# Patient Record
Sex: FEMALE | Race: BLACK OR AFRICAN AMERICAN | NOT HISPANIC OR LATINO | Employment: FULL TIME | URBAN - METROPOLITAN AREA
[De-identification: names, ages, dates, MRNs, and addresses within clinical notes are randomized per-mention and may not be internally consistent; named-entity substitution may affect disease eponyms.]

---

## 2023-09-05 ENCOUNTER — HOSPITAL ENCOUNTER (OUTPATIENT)
Dept: DATA CONVERSION | Facility: HOSPITAL | Age: 38
Discharge: HOME | End: 2023-09-05
Payer: COMMERCIAL

## 2023-09-05 DIAGNOSIS — E55.9 VITAMIN D DEFICIENCY, UNSPECIFIED: ICD-10-CM

## 2023-09-05 DIAGNOSIS — I10 ESSENTIAL (PRIMARY) HYPERTENSION: ICD-10-CM

## 2023-09-05 DIAGNOSIS — R73.03 PREDIABETES: ICD-10-CM

## 2023-09-05 DIAGNOSIS — Z00.00 ENCOUNTER FOR GENERAL ADULT MEDICAL EXAMINATION WITHOUT ABNORMAL FINDINGS: ICD-10-CM

## 2023-09-05 LAB
25(OH)D3 SERPL-MCNC: 17 NG/ML (ref 31–100)
ALBUMIN SERPL-MCNC: 4 GM/DL (ref 3.5–5)
ALBUMIN/GLOB SERPL: 1.4 RATIO (ref 1.5–3)
ALP BLD-CCNC: 64 U/L (ref 35–125)
ALT SERPL-CCNC: 23 U/L (ref 5–40)
ANION GAP SERPL CALCULATED.3IONS-SCNC: 12 MMOL/L (ref 0–19)
APPEARANCE PLAS: CLEAR
AST SERPL-CCNC: 18 U/L (ref 5–40)
BILIRUB SERPL-MCNC: 0.5 MG/DL (ref 0.1–1.2)
BUN SERPL-MCNC: 9 MG/DL (ref 8–25)
BUN/CREAT SERPL: 12.9 RATIO (ref 8–21)
CALCIUM SERPL-MCNC: 9.2 MG/DL (ref 8.5–10.4)
CHLORIDE SERPL-SCNC: 110 MMOL/L (ref 97–107)
CHOLEST SERPL-MCNC: 137 MG/DL (ref 133–200)
CHOLEST/HDLC SERPL: 3 RATIO
CO2 SERPL-SCNC: 22 MMOL/L (ref 24–31)
COLOR SPUN FLD: YELLOW
CREAT SERPL-MCNC: 0.7 MG/DL (ref 0.4–1.6)
DEPRECATED RDW RBC AUTO: 41.2 FL (ref 37–54)
ERYTHROCYTE [DISTWIDTH] IN BLOOD BY AUTOMATED COUNT: 12.1 % (ref 11.7–15)
FASTING STATUS PATIENT QL REPORTED: ABNORMAL
GFR SERPL CREATININE-BSD FRML MDRD: 115 ML/MIN/1.73 M2
GLOBULIN SER-MCNC: 2.8 G/DL (ref 1.9–3.7)
GLUCOSE SERPL-MCNC: 96 MG/DL (ref 65–99)
HBA1C MFR BLD: 5.6 % (ref 4–6)
HCT VFR BLD AUTO: 38.6 % (ref 36–44)
HDLC SERPL-MCNC: 45 MG/DL
HGB BLD-MCNC: 13.1 GM/DL (ref 12–15)
LDLC SERPL CALC-MCNC: 75 MG/DL (ref 65–130)
MCH RBC QN AUTO: 31.3 PG (ref 26–34)
MCHC RBC AUTO-ENTMCNC: 33.9 % (ref 31–37)
MCV RBC AUTO: 92.3 FL (ref 80–100)
NRBC BLD-RTO: 0 /100 WBC
PLATELET # BLD AUTO: 340 K/UL (ref 150–450)
PMV BLD AUTO: 9.2 CU (ref 7–12.6)
POTASSIUM SERPL-SCNC: 3.4 MMOL/L (ref 3.4–5.1)
PROT SERPL-MCNC: 6.8 G/DL (ref 5.9–7.9)
RBC # BLD AUTO: 4.18 M/UL (ref 4–4.9)
SODIUM SERPL-SCNC: 144 MMOL/L (ref 133–145)
TRIGL SERPL-MCNC: 84 MG/DL (ref 40–150)
TSH SERPL DL<=0.05 MIU/L-ACNC: 0.96 MIU/L (ref 0.27–4.2)
WBC # BLD AUTO: 5.7 K/UL (ref 4.5–11)

## 2023-11-28 ENCOUNTER — TELEPHONE (OUTPATIENT)
Dept: PRIMARY CARE | Facility: CLINIC | Age: 38
End: 2023-11-28
Payer: COMMERCIAL

## 2023-11-28 DIAGNOSIS — I10 PRIMARY HYPERTENSION: Primary | ICD-10-CM

## 2023-11-28 RX ORDER — HYDROCHLOROTHIAZIDE 12.5 MG/1
TABLET ORAL
COMMUNITY
End: 2023-11-28 | Stop reason: SDUPTHER

## 2023-11-28 RX ORDER — AMLODIPINE BESYLATE 5 MG/1
TABLET ORAL EVERY 24 HOURS
COMMUNITY
End: 2023-11-28 | Stop reason: SDUPTHER

## 2023-11-28 RX ORDER — AMLODIPINE BESYLATE 5 MG/1
5 TABLET ORAL DAILY
Qty: 90 TABLET | Refills: 1 | Status: SHIPPED | OUTPATIENT
Start: 2023-11-28 | End: 2024-05-26

## 2023-11-28 RX ORDER — HYDROCHLOROTHIAZIDE 12.5 MG/1
12.5 TABLET ORAL DAILY
Qty: 90 TABLET | Refills: 1 | Status: SHIPPED | OUTPATIENT
Start: 2023-11-28 | End: 2024-05-26

## 2023-11-28 NOTE — TELEPHONE ENCOUNTER
Physical  08/31/2023 pt called in for refill on BP meds they have been populated as well as pharmacy. Please advise

## 2023-12-08 ENCOUNTER — OFFICE VISIT (OUTPATIENT)
Dept: OPHTHALMOLOGY | Facility: CLINIC | Age: 38
End: 2023-12-08

## 2023-12-08 DIAGNOSIS — H40.003 GLAUCOMA SUSPECT OF BOTH EYES: ICD-10-CM

## 2023-12-08 DIAGNOSIS — H18.603 KERATOCONUS OF BOTH EYES: Primary | ICD-10-CM

## 2023-12-08 DIAGNOSIS — H52.213 IRREGULAR ASTIGMATISM OF BOTH EYES: ICD-10-CM

## 2023-12-08 DIAGNOSIS — H52.13 MYOPIA, BILATERAL: ICD-10-CM

## 2023-12-08 LAB
AVERAGE RNFL TODAY (OD): 99 MICRONS
AVERAGE RNFL TODAY (OS): NORMAL MICRONS
KMAX (OD): 50.5 DIOPTERS
KMAX (OS): 58.9 DIOPTERS
PACH THINNEST (OD): 494 MICRONS
PACH THINNEST (OS): 425 MICRONS
POSTERIOR FLOAT (OD): 15 MICRONS
POSTERIOR FLOAT (OS): 71 MICRONS
SIMK FLAT (OD): 46.5 DIOPTERS
SIMK FLAT (OS): 48.6 DIOPTERS
SIMK STEEP (OD): 48.7 DIOPTERS
SIMK STEEP (OS): 53.9 DIOPTERS

## 2023-12-08 PROCEDURE — 92014 COMPRE OPH EXAM EST PT 1/>: CPT | Performed by: OPTOMETRIST

## 2023-12-08 PROCEDURE — V2599 CONTACT LENS/ES OTHER TYPE: HCPCS | Performed by: OPTOMETRIST

## 2023-12-08 PROCEDURE — 92025 CPTRIZED CORNEAL TOPOGRAPHY: CPT | Performed by: OPTOMETRIST

## 2023-12-08 PROCEDURE — 92133 CPTRZD OPH DX IMG PST SGM ON: CPT | Performed by: OPTOMETRIST

## 2023-12-08 PROCEDURE — 92015 DETERMINE REFRACTIVE STATE: CPT | Performed by: OPTOMETRIST

## 2023-12-08 PROCEDURE — 92072 FITG C-LENS KERATOCONUS 1ST: CPT | Performed by: OPTOMETRIST

## 2023-12-08 RX ORDER — TRIAMCINOLONE ACETONIDE 5 MG/G
OINTMENT TOPICAL
COMMUNITY
Start: 2023-05-25 | End: 2024-03-20 | Stop reason: WASHOUT

## 2023-12-08 RX ORDER — PRENATAL VIT 91/IRON/FOLIC/DHA 28-975-200
1 COMBINATION PACKAGE (EA) ORAL EVERY 12 HOURS
COMMUNITY
Start: 2023-02-09 | End: 2024-03-20 | Stop reason: ALTCHOICE

## 2023-12-08 ASSESSMENT — REFRACTION_CURRENTRX
OD_DIAMETER: 14.5
OD_ADDL_SPECS: MED SKIRT
OD_SPHERE: PLANO
OS_DIAMETER: 14.5
OS_SPHERE: -3.50
OS_ADDL_SPECS: MED SKIRT

## 2023-12-08 ASSESSMENT — REFRACTION_MANIFEST
OD_CYLINDER: -1.75
OS_AXIS: 120
OD_SPHERE: -1.50
OD_AXIS: 033
OS_AXIS: 120
OS_CYLINDER: -5.25
METHOD_AUTOREFRACTION: 1
OD_SPHERE: -1.00
OD_CYLINDER: -2.25
OS_SPHERE: -2.50
OD_AXIS: 043
OS_SPHERE: -2.25
OS_CYLINDER: -6.50

## 2023-12-08 ASSESSMENT — ENCOUNTER SYMPTOMS
HEMATOLOGIC/LYMPHATIC NEGATIVE: 0
PSYCHIATRIC NEGATIVE: 0
EYES NEGATIVE: 0
NEUROLOGICAL NEGATIVE: 0
RESPIRATORY NEGATIVE: 0
CARDIOVASCULAR NEGATIVE: 0
ALLERGIC/IMMUNOLOGIC NEGATIVE: 0
MUSCULOSKELETAL NEGATIVE: 0
GASTROINTESTINAL NEGATIVE: 0
ENDOCRINE NEGATIVE: 0
CONSTITUTIONAL NEGATIVE: 0

## 2023-12-08 ASSESSMENT — VISUAL ACUITY
OD_SC: 20/50
METHOD: SNELLEN - LINEAR
OS_SC: 20/160
OS_SC+: -1

## 2023-12-08 ASSESSMENT — TONOMETRY
IOP_METHOD: TONOPEN
OS_IOP_MMHG: 11
OD_IOP_MMHG: 15
IOP_METHOD: GOLDMANN APPLANATION
OD_IOP_MMHG: 11
OS_IOP_MMHG: 15

## 2023-12-08 ASSESSMENT — CONF VISUAL FIELD
OD_NORMAL: 1
OS_INFERIOR_NASAL_RESTRICTION: 0
OD_INFERIOR_NASAL_RESTRICTION: 0
OS_INFERIOR_TEMPORAL_RESTRICTION: 0
OD_INFERIOR_TEMPORAL_RESTRICTION: 0
OS_NORMAL: 1
OD_SUPERIOR_NASAL_RESTRICTION: 0
OS_SUPERIOR_TEMPORAL_RESTRICTION: 0
OS_SUPERIOR_NASAL_RESTRICTION: 0
OD_SUPERIOR_TEMPORAL_RESTRICTION: 0

## 2023-12-08 ASSESSMENT — EXTERNAL EXAM - RIGHT EYE: OD_EXAM: NORMAL

## 2023-12-08 ASSESSMENT — CUP TO DISC RATIO
OS_RATIO: 0.6
OD_RATIO: 0.4

## 2023-12-08 ASSESSMENT — SLIT LAMP EXAM - LIDS
COMMENTS: NORMAL
COMMENTS: NORMAL

## 2023-12-08 ASSESSMENT — EXTERNAL EXAM - LEFT EYE: OS_EXAM: NORMAL

## 2023-12-08 NOTE — PROGRESS NOTES
Assessment/Plan   Diagnoses and all orders for this visit:  Keratoconus of both eyes  -     Corneal Topography - OU - Both Eyes  - Educated patient. KCN stable to previous findings. Will order pt habitual hybrid lenses (VDP) after prior auth is received. Pt to [facility] for CL dispense and assessment visit. Discussed other CL options with patient.   Glaucoma suspect of both eyes  -     OCT, Optic Nerve - OU - Both Eyes  - Larger, deep nerves with asymmetric cup-to-disc ratio (C/D) ratio. (-) known Fhx of glaucoma. Pressures 15/15 with Tonopen, 11/11 with GAT. RNFL OCT showed no thinning OU. Monitor yearly with RNFL OCT and DFE.   Myopia, bilateral  Irregular astigmatism of both eyes  New spec rx released today per patient request. Ocular health wnl for age OU. Monitor 1 year or sooner prn. Refraction billed today.

## 2023-12-15 ENCOUNTER — CLINICAL SUPPORT (OUTPATIENT)
Dept: PRIMARY CARE | Facility: CLINIC | Age: 38
End: 2023-12-15
Payer: COMMERCIAL

## 2023-12-15 DIAGNOSIS — Z30.42 ENCOUNTER FOR SURVEILLANCE OF INJECTABLE CONTRACEPTIVE: Primary | ICD-10-CM

## 2023-12-15 LAB — PREGNANCY TEST URINE, POC: NEGATIVE

## 2023-12-15 PROCEDURE — 81025 URINE PREGNANCY TEST: CPT | Performed by: FAMILY MEDICINE

## 2023-12-15 PROCEDURE — 2500000004 HC RX 250 GENERAL PHARMACY W/ HCPCS (ALT 636 FOR OP/ED): Performed by: FAMILY MEDICINE

## 2023-12-15 RX ORDER — MEDROXYPROGESTERONE ACETATE 150 MG/ML
150 INJECTION, SUSPENSION INTRAMUSCULAR
Qty: 1 ML | Refills: 0 | Status: SHIPPED | OUTPATIENT
Start: 2023-12-15 | End: 2023-12-15 | Stop reason: ENTERED-IN-ERROR

## 2023-12-15 RX ORDER — MEDROXYPROGESTERONE ACETATE 150 MG/ML
150 INJECTION, SUSPENSION INTRAMUSCULAR ONCE
Status: COMPLETED | OUTPATIENT
Start: 2023-12-15 | End: 2023-12-15

## 2023-12-15 RX ADMIN — MEDROXYPROGESTERONE ACETATE 150 MG: 150 INJECTION, SUSPENSION INTRAMUSCULAR at 11:43

## 2023-12-15 NOTE — PROGRESS NOTES
Order placed.  Initial order was mistakenly sent as outpatient prescription.  Please call patient's listed [facility] pharmacy and cancel order

## 2024-01-03 ENCOUNTER — TELEPHONE (OUTPATIENT)
Dept: PRIMARY CARE | Facility: CLINIC | Age: 39
End: 2024-01-03
Payer: COMMERCIAL

## 2024-01-03 NOTE — TELEPHONE ENCOUNTER
Letter created per request of testing that patient was seen on 12/15/2023 for nurse visit.  Please contact her to let her know this has been done and to confirm where she would like this to be sent.  Letter in my outgoing bin

## 2024-01-03 NOTE — TELEPHONE ENCOUNTER
Patient called in stating that she needs a letter from her nurse visit appmt in December. States she just needs documentation that she was seen in office. Please advise on letter

## 2024-01-03 NOTE — LETTER
January 3, 2024     Patient: Brittni Soto   YOB: 1985   Date of Visit: 12/15/2023       To Whom It May Concern:    Brittni Soto was seen in my clinic on 12/15/2023 for a scheduled nurse vist?. Please excuse Brittni for her absence to make the appointment.    If you have any questions or concerns, please don't hesitate to call.         Sincerely,           Farshad Montana D.O.

## 2024-01-08 NOTE — TELEPHONE ENCOUNTER
Spoke to pt which pt confirms she received her letter on [employer] which she does not need us to send an hard copy.

## 2024-01-12 ENCOUNTER — OFFICE VISIT (OUTPATIENT)
Dept: OPHTHALMOLOGY | Facility: CLINIC | Age: 39
End: 2024-01-12
Payer: COMMERCIAL

## 2024-01-12 DIAGNOSIS — H18.603 KERATOCONUS OF BOTH EYES: Primary | ICD-10-CM

## 2024-01-12 PROCEDURE — 99070(U19) SCLERAFIL(U19): Performed by: OPTOMETRIST

## 2024-01-12 PROCEDURE — CYTAX SALES TAX CUYAHOGA COUNT: Performed by: OPTOMETRIST

## 2024-01-12 PROCEDURE — 99212 OFFICE O/P EST SF 10 MIN: CPT | Performed by: OPTOMETRIST

## 2024-01-12 ASSESSMENT — ENCOUNTER SYMPTOMS
RESPIRATORY NEGATIVE: 0
ALLERGIC/IMMUNOLOGIC NEGATIVE: 0
ENDOCRINE NEGATIVE: 0
CONSTITUTIONAL NEGATIVE: 0
PSYCHIATRIC NEGATIVE: 0
CARDIOVASCULAR NEGATIVE: 0
HEMATOLOGIC/LYMPHATIC NEGATIVE: 0
NEUROLOGICAL NEGATIVE: 0
EYES NEGATIVE: 0
MUSCULOSKELETAL NEGATIVE: 0
GASTROINTESTINAL NEGATIVE: 0

## 2024-01-12 ASSESSMENT — REFRACTION_CURRENTRX
OD_DIAMETER: 14.5
OS_DIAMETER: 14.5
OD_SPHERE: PLANO
OS_ADDL_SPECS: MED SKIRT
OS_SPHERE: -3.50
OD_ADDL_SPECS: MED SKIRT

## 2024-01-12 ASSESSMENT — VISUAL ACUITY
VA_OR_OD_CURRENT_RX: 20/25+2
OS_SC: 20/100
METHOD: SNELLEN - LINEAR
OD_SC: 20/40
VA_OR_OS_CURRENT_RX: 20/20

## 2024-01-12 ASSESSMENT — EXTERNAL EXAM - RIGHT EYE: OD_EXAM: NORMAL

## 2024-01-12 ASSESSMENT — EXTERNAL EXAM - LEFT EYE: OS_EXAM: NORMAL

## 2024-01-12 ASSESSMENT — SLIT LAMP EXAM - LIDS
COMMENTS: NORMAL
COMMENTS: NORMAL

## 2024-01-12 NOTE — PROGRESS NOTES
Assessment/Plan   Diagnoses and all orders for this visit:  Keratoconus of both eyes  -KCN stable to previous findings. Dispense (habitual) hybrid lenses (Pharminox).    Specialty Contact Lenses:  Specialty contact lenses were dispensed today for treatment of a medically indicated condition. The patient was educated on the proper care, handling, insertion and removal of the lenses.  The patient should follow the wearing time and return for follow-up as indicated, and call or come in to the office if the eye becomes red or painful after wearing the lenses.       Follow up 2-3 weeks

## 2024-01-12 NOTE — PATIENT INSTRUCTIONS
Dr. Makayla Moreau        Appointments:   [phone]  Contact Lens Orders:  [phone]       GAS PERMEABLE CONTACT LENSES  CARE AND GENERAL GUIDELINES  General Contact Lens Guidelines    Any time a contact lens is removed from the eye, it must be cleaned and disinfected before being reinserted    Always wash hands before handling contact lenses.  Avoid soaps containing additives such as lotions, creams, oils or perfumes. Anti-bacterial soaps are recommended    Whenever lenses have been removed from the case, discard the solution, rinse the case vigorously with saline or disinfecting solution, wipe with clean, dry tissue and allow it to air dry upside down.      Replace lens case monthly.  It is critical to keep your lens case CLEAN!     Routine replacement of the contact lens case can help to reduce the risk of contact lens contamination    Use only commercially prepared saline and cleaning solutions, never use homemade or generic saline    Never reuse solutions after one cleaning/disinfection cycle    Never use saliva or water to moisten a contact lens, never put a contact lens in your mouth.  Doing any of these may lead to an eye infection    Patients should always check with us before changing solutions    Contact lenses should never be stored in non-sterile fluids such as distilled water, tap water, bottled water or home purified water    Lenses or cases should not be rinsed in tap water.  Traditional contact lens approved saline solutions may be used to rinse  off lenses or if needed prior to insertion.    Lens lubricating/rewetting drops can be placed directly into the eye while contact lenses are being worn to increase lens wearing comfort    Do not sleep in you lenses unless you have been fit with lenses specifically designed for extended-wear and your doctor has approved them for  that purpose    Avoid swimming pools and hot tubs while wearing your lenses    All contact lens wearers, with few exceptions, need a pair of spectacles for emergencies and for rest from contact lenses    Contact lens wearers should have an eye exam annually or sooner, if indicated by your doctor    Cosmetics:     Apply makeup after inserting contacts and remove contact lenses before removing makeup.  This will help prevent transfer of these substances from your hands to the lens surface.    Use a good quality, water soluble mascara rather than waterproof or water-resistant types.  Replace old mascara every three months as it may become contaminated and cause infection.    Avoid aerosol sprays when your contacts are in, or remember to shield your eyes.  Walk away from the area where the spray was used since a mist of spray often lingers in the air.  If possible, use hairspray before inserting your contact lenses.    Adaptation:    Complete adaptation to contact lenses normally takes from 2-6 weeks, and some patients may take up to 12 weeks.  Normal adaptation symptoms include:    A sensation that feels much like a small eyelash is in the eye.  This feeling gradually disappears    Vision may be a little watery and may change as you blink    You may notice mild redness, tearing or itching of the eyes    Awareness of lens movement or increased blinking may be noticed.  It is important that you continue to blink fully and completely    You may be sensitive to bright light.  This sensitivity will lessen, but a good non-prescription pair of sunglasses will often provide the greatest comfort outdoors    You may experience halos or edge awareness while adapting    Some patients have temporarily improved uncorrected vision when they remove their lenses    You may notice variable vision when you remove your lenses.  This may include increased blurred vision with no glasses or with your habitual glasses, which lasts from 30  minutes to up to 4 hours after rigid lenses are removed.  This effect is greatest with patients that have had corneal surgery.    Signs of Caution: If you are ever unsure about whether what you are experiencing is part of normal adaptation, please call your doctor.    Persistent or severe redness  Continued or excessive tearing  Extreme light sensitivity  Inability to open eyes   Pain      REMEMBER: If in DOUBT, take your lenses OUT!            Lens Wear:    We have recommended a schedule for wearing the lenses during the adaptation period.  This consists of slowly increasing the wearing time so your eyes adapt properly to the lenses.  How rapidly the wearing time increases depends upon the type of lens being worn and the specific characteristics of your eyes.  Follow the wearing schedule below unless your doctor tells you otherwise:    Day One: 4 hours  Day Two: 5 hours  Day Three: 6 hours  Day Four: 7 hours  Day Five: 8 hours  Day Six: 9 hours  Day Seven: 10 hours    After Day Seven you will remain at 10 hours of wearing time maximum until your scheduled follow-up appointment.    REWETTING DROPS FOR COMFORT:    *Acceptable rigid gas permeable rewetting drops:    Prince Rewetting Drops   Refresh Relieva for Contacts Rewetting Drops   Blink for Contacts Rewetting Drops                *We do not recommend artificial tears to be used while the lenses are worn.    NOTE for Saline Rinses:     You may purchase a commercially available saline such as Bausch & Lomb Sensitive Eyes Saline in a drug store, or a single use non-preserved saline solution such as Lacripure (by Menicon) or ScleralFil (by Bausch & Lomb) available at our office or on Amazon    DO NOT STORE LENSES OVERNIGHT IN SALINE SOLUTION, IT IS USED FOR RINSES ONLY      FOR TRADITIONAL RIGID GAS PERMEABLE CONTACT LENSES  (These are the smaller rigid lens types that sit directly on your cornea)    INSERTION  o Wash hands with lotion free soap and DRY HANDS.   o  Place lens in the palm of your hand.   o Rinse lens (if needed) and drain excess solution by cupping your hand.   o Place lens on DRY index finger of dominant hand  o Use your middle finger of the non-dominant hand to hold up your upper lashes and your      other middle finger to pull your lower lid down.   o Place lens directly on the colored part of your eye. Pull your index finger away and slowly release your lids     BOSTON and UNIQUE pH USERS: If the lens has been soaking overnight, you may insert the lens directly onto your eye in the morning with the Xenia or Unique pH conditioning solution still on the lens, or you may rinse the lens in fresh rewetting/conditioning solution and then place it directly onto the eye.    CLEAR CARE USERS: If the lens has been fully neutralized, we suggest another rinse with fresh saline or a conditioning solution (Xenia or Unique pH) prior to insertion     REMOVAL  BLINK METHOD  Tilt your head down while looking into a mirror flat on a table.    Cup your hand under the eye to catch the lens as it is removed from the eye.  Open your eyes wide and pull tightly (pull out and up) at the outer corner of your eye.  Blink hard.  The lens should pop out    TWO HAND METHOD  Tilt your head down while looking into a mirror flat on a table.  Place the index finger of one hand on the upper eyelid directly next to the lash line and above the contact lens.  Place the index finger of the other hand similarly on the lower lid, but below the contact lens.  (You must be very close to your lash line!  Do not invert your lids to show the inner pink portion underneath!)  Gently press the lids against the white part of the eye.  Maintain pressure against the eye and gently bring the upper and lower lids together to squeeze the lens out.  The lens should pop out.    CLEANING ….If cleaning lenses over a sink make sure that the drain is plugged!!!    IF USING BOSTON ORIGINAL OR BOSTON ADVANCE  Remove  lens and place in palm of hand  Place 2 drops of daily  on lens (maroon cap, red tip)   Rub lens gently for 15 seconds  Rinse lens with saline  Place lens in contact lens case with fresh conditioning solution (blue cap, white tip) overnight (at least 6 hours)    IF USING BOSTON SIMPLUS or UNIQUE pH**  Remove lens and place in palm of hand  Place 2 drops of Rea Simplus or Unique pH solution on lens  Rub lens gently for 15 seconds, rinse with saline  Place lens in contact lens case with fresh Rea Simplus or Unique pH solution overnight (at least 6 hours)      IF USING CLEAR CARE    Rub lenses with Clear Care solution (watch touching eyes after this because this is hydrogen peroxide and if it touches your eye directly it will sting!0  Rinse lenses with saline solution  Place lenses in appropriate side of basket marked right or left.  Fill clear vial to the line with Clear Care disinfecting solution.  Place contacts in disinfecting solution (foaming will occur, don’t be alarmed if vial overflows) for at least 6 hours  The disk neutralizes the disinfecting solution after 6 hours  Rinse the contacts with a commercially prepared saline prior to insertion and a RGP approved rewetting or conditioning solution may be used to insert  The container and disk must be discarded whenever you purchase new /rinse and disinfectant.    Not changing/discarding the disk will result in traces of disinfectant on the lenses even after a saline rinse.  You would then experience mild burning and redness      NOTE for Saline Rinses:     You may purchase a commercially available saline such as Bausch & Lomb Sensitive Eyes Saline in a drug store, or a single use non-preserved saline solution such as Lacripure (by [employer]) or ScleralFil (by Bausch & Lomb) available at our office or on Amazon    DO NOT STORE LENSES OVERNIGHT IN SALINE SOLUTION, IT IS USED FOR RINSES ONLY    IF YOUR DOCTOR HAS SUGGESTED USING PROGENT  TREATMENTS:    If using Progent, it is very important to follow the instruction in the package;    Place lenses in the proper side (R/L) on the cap of the case  Mix solution A and B at the same time into the case  Put cap on case and shake vigorously for 1 minute  Leave on countertop for ONLY 30 minutes  When 30 minutes is complete uncap the case and throw the solution directly down the drain followed by water  Rinse lenses thoroughly with Saline Solution  DISINFECT and STORE lenses overnight in your prescribed lens care solution as listed above prior to insertion  You can buy PROGENT through our office or [email or web address] with the following code for the  [facility] [place] [id number]    IF YOUR LENS IS COATED WITH HYDRAPEG THE ONLY SOLUTIONS YOU CAN USE ARE…   o Loma Simplus   o Unique Ph   o Clear Care    * do not use additional abrasive (like Loma [red top] ) or alcohol based  with either option   * Progent will remove the HydraPeg Coating       IF YOU WEAR….PIGGYBACK CONTACT LENSES    *Insert soft contact lens first!    INSERTING SOFT CONTACT LENSES    Wash hands with lotion free soap and DRY HANDS.  Place lens in the palm of your hand.  Rinse lens and drain excess solution by cupping your hand.  Place lens on DRY index finger  Lens should look like a bowl with no sides touching your finger  Use your middle finger to hold up your upper lashes and your other middle finger to pull your lower lid down.  Place lens directly on the colored part of your eye.  Pull your index finger away and while continuing to hold your lids look up, down, left and right.   Once both soft lenses are in place rigid gas permeable lens right on top of it  Follow steps for insertion of rigid gas permeable lenses     REMOVING SOFT CONTACT LENSES  *Remove the rigid lens using the above instructions of rigid gas permeable lenses   *Once your rigid contact lens is removed you may remove your soft contact  lens.  Hands should already be washed with lotion free soap and dried  Use your middle finger to hold up your upper lashes and your other middle finger to pull your lower lid down.  Using your thumb and index finger pinch the edge of the lens and pull it off your eye.  Follow the recommended cleaning and storage procedures (if not a daily disposable soft lens) and repeat for the other eye.    CLEANING… OF SOFT & RIGID PIGGYBACK CONTACT LENSES  *Clean and store the rigid lens as listed in this packet. Many soft lenses worn as piggybacks are now daily disposables and do not require cleaning, if soft lenses are not daily disposables follow the instructions below.     VERY IMPORTANT: When a rigid lens is placed atop the soft lens, it MUST be rinsed with saline or multipurpose soft lens solutions prior to inserting the rigid lens.  That is, no not insert the RGP lens on top of the soft with rigid lens solutions such as [place], Unique Ph or Optimum.     SOFT CONTACT LENSES:  Follow these steps for cleaning and storing the soft lens (unless they daily disposable, those are discarded nightly and not re-cleaned).  Once lens is removed place it in palm of hand  Place 5 drops of soft lens multipurpose solution on lens and gently rub for 15 seconds  Rinse lenses using soft lens multipurpose solution  Place lens in case filled with fresh soft multipurpose lens solution  Soak lenses 6 hours to disinfect   Remove lenses from case and rinse with fresh soft multipurpose solution prior to insertion  Discard used solution from case and wipe case dry until next use       Recommended Soft Contact Lens Multipurpose Solutions:     Optifree Express   Optifree Replenish   Optifree PureMoist   BioTrue   Priscila   Revitalens      Note: Your doctor may recommend another soft lens disinfecting solution; If Clear Care is recommended follow the instructions for Clear Care above.      IF YOU WEAR….SYNERGEYES (HYBRID) CONTACT LENSES   See this website  for video instructions on SynergEyes Care and Handling: [email or web address]    INSERTING SYNERGEYES LENSES:     TWO FINGER METHOD  Place lens concave side up in between your index and middle finger  Fill lens to the top with unit dose non-preserved saline (Lacripure or ScleralFil preferred)  Place head parallel with floor and hold lids open   Gently push lens up to eye  Hold for 5 seconds then remove fingers from lens    TRIPOD METHOD  Place lens concave side up balancing between your thumb, middle and index fingers to form a tripod to balance on  Fill lens to the top with unit dose non-preserved saline (Lacripure or ScleralFil preferred)  Place head parallel with floor and hold lids open   Gently push lens up to eye  Hold for 5 seconds then fingers from lens     PLUNGER METHOD  Place lens concave side up on large plunger-grubbs  Fill lens to the top with unit dose non-preserved saline (Lacripure or ScleralFil preferred)  Place head parallel with floor and hold lids open   Gently push lens and plunger up to eye  Hold for 5 seconds then remove plunger-grubbs    REMOVING SYNERGEYES LENSES:   Place your left middle finger to hold up your upper lashes and your right middle finger to pull your lower lid down  Looking straight ahead or slightly up, use the pads of your thumb and index finger to pinch the lower edge of soft skirt and pull it off your eye  Using a narrow pinch and VERY dry hands will make removal easier  Do not use a plunger to remove SynergEyes lenses    CLEANING… If cleaning lenses over a sink make sure that the drain is plugged!!!  *BioTrue, Optifree Express, or ClearCare are recommended for use with Synergeyes lenses.    IF USING OPTIFREE EXPRESS OR BIOTRUE  Place lens concave side up on palm of hand  Gently rub lens for 5 seconds with multipurpose solution  Rinse thoroughly with multipurpose solution  Place in contact lens case with fresh multipurpose solution for 6  hours    IF USING CLEAR CARE  Rub lenses with Clear Care solution (watch touching eyes after this because this is hydrogen peroxide and if it touches your eye directly it will sting!)  Rinse lenses with saline solution  Place lenses in appropriate side of basket marked right or left.  Fill clear vial to the line with Clear Care disinfecting solution.  Place contacts in disinfecting solution (foaming will occur, don’t be alarmed if vial overflows) for at least 6 hours  The disk neutralizes the disinfecting solution after 6 hours  Rinse the contacts with a commercially prepared saline prior to insertion and a [facility] approved rewetting or conditioning solution may be used to insert  The container and disk must be discarded whenever you purchase new /rinse and disinfectant.    Not changing/discarding the disk will result in traces of disinfectant on the lenses even after a saline rinse.  You would then experience mild burning and redness    IF YOU WEAR….SCLERAL CONTACT LENSES     *VERY IMPORTANT*: ONLY NON-PRESERVED UNIT DOSE SALINE IS USED TO INSERT SCLERAL LENSES, NO OTHER PRESERVED SALINE OR MULTIPUPROSE SOLUTIONS ARE ALLOWED!    INSERTING SCLERAL LENSES     HARDEN METHOD  Wash hands  Place lens concave side up on a clean large harden  Fill lens to the top with unit dose non-preserved saline (Lacripure or ScleralFil preferred)*  Place head parallel with floor and hold lids open with one hand  Gently push lens up on to eye  Will feel cold as saline touches eye  Remove harden  from eye and lift head slowly  If bubble is in chamber repeat insertion with more saline filling lens     TRIPOD METHOD  Place lens concave side up resting on thumb, pointer finger and middle finger or on pointer and middle finger  Fill lens to top with unit dose non-preserved saline (Lacripure or ScleralFil preferred)*  Place head parallel with floor and hold lids open  Gently push lens up on to eye  Will feel cold as saline touches eye  If  bubble is in chamber repeat insertion with more unit dose non-preserved saline filling lens    REMOVING SCLERAL LENSES     SUCTION CUP METHOD  Use lower lid to loosen inferior lens edge at 6 o’clock, try to create an air bubble behind the lens    Place clean suction cup at 6 o’clock position of lens  Gently tilt lens off of eye     TWO HAND METHOD  Hold eye wide open   Look down and push upper lid under lens to break seal  Can also be accomplished by pushing in on the white part of the eye below the lens to break the seal  Keep upper lid firmly against top of lens  Use lower lid to push beneath and under the lower edge of the lens, the lens should pop out of the eye    CLEANING… SCLERAL LENSES   Use only [place], Unique pH**, or Clear Care cleaning solutions; Follow steps above for specific cleaning system    For more information or help on insertion and removal and troubleshooting of scleral lenses refer to www.sclerallens.org  * Your doctor may prescribe unit dose saline through your pharmacy   *Lacripure may be purchased at [email or web address] or from our office, or [employer]  * ScleralFil is made by Bausch & Lomb and may be purchased at [employer] or from our office   **Unique ph may be purchased at [email or web address]  or from our office or Amazon.      CONTACT LENS FEES/CREDIT POLICIES:    All contact lens material fees are due the day that lenses are ordered, with the exception of eligible Medicare patients or medically indicated lenses. Fitting fees are due by the final fitting visit.        Standard rigid lenses are warranted for 90 days from the original order date against breakage, parameter changes or cancellation at 100%.  Lost lenses cannot be credited.  Specialty rigid lenses will be credited at 75% against cancellation within the 90 day warranty period.      Fitting fees are professional service fees which cannot be credited once a fitting is initiated.          The cost of my lenses  per eye are $_________________.    The cost of my entire fitting fee is $_________________.    Patient signature: _____________________________ Date: ________________    Tech/Doctor’s Signature:  ________________________ Date: ________________

## 2024-02-02 ENCOUNTER — APPOINTMENT (OUTPATIENT)
Dept: OPHTHALMOLOGY | Facility: CLINIC | Age: 39
End: 2024-02-02
Payer: COMMERCIAL

## 2024-03-20 ENCOUNTER — OFFICE VISIT (OUTPATIENT)
Dept: PRIMARY CARE | Facility: CLINIC | Age: 39
End: 2024-03-20
Payer: COMMERCIAL

## 2024-03-20 VITALS
WEIGHT: 200 LBS | TEMPERATURE: 97.4 F | DIASTOLIC BLOOD PRESSURE: 86 MMHG | HEIGHT: 66 IN | OXYGEN SATURATION: 99 % | SYSTOLIC BLOOD PRESSURE: 136 MMHG | BODY MASS INDEX: 32.14 KG/M2 | HEART RATE: 87 BPM

## 2024-03-20 DIAGNOSIS — I10 PRIMARY HYPERTENSION: Primary | ICD-10-CM

## 2024-03-20 DIAGNOSIS — R73.03 PREDIABETES: ICD-10-CM

## 2024-03-20 DIAGNOSIS — Z80.3 FAMILY HISTORY OF BREAST CANCER: ICD-10-CM

## 2024-03-20 DIAGNOSIS — Z30.42 ENCOUNTER FOR SURVEILLANCE OF INJECTABLE CONTRACEPTIVE: ICD-10-CM

## 2024-03-20 DIAGNOSIS — E55.9 VITAMIN D DEFICIENCY: ICD-10-CM

## 2024-03-20 PROBLEM — L73.2 HIDRADENITIS SUPPURATIVA: Status: ACTIVE | Noted: 2020-05-12

## 2024-03-20 PROBLEM — F41.8 DEPRESSION WITH ANXIETY: Status: ACTIVE | Noted: 2024-03-20

## 2024-03-20 PROBLEM — K21.9 GASTROESOPHAGEAL REFLUX DISEASE: Status: ACTIVE | Noted: 2024-03-20

## 2024-03-20 LAB — PREGNANCY TEST URINE, POC: NEGATIVE

## 2024-03-20 PROCEDURE — 99214 OFFICE O/P EST MOD 30 MIN: CPT | Performed by: FAMILY MEDICINE

## 2024-03-20 PROCEDURE — 2500000004 HC RX 250 GENERAL PHARMACY W/ HCPCS (ALT 636 FOR OP/ED): Performed by: FAMILY MEDICINE

## 2024-03-20 PROCEDURE — 1036F TOBACCO NON-USER: CPT | Performed by: FAMILY MEDICINE

## 2024-03-20 PROCEDURE — 3075F SYST BP GE 130 - 139MM HG: CPT | Performed by: FAMILY MEDICINE

## 2024-03-20 PROCEDURE — 81025 URINE PREGNANCY TEST: CPT | Performed by: FAMILY MEDICINE

## 2024-03-20 PROCEDURE — 3079F DIAST BP 80-89 MM HG: CPT | Performed by: FAMILY MEDICINE

## 2024-03-20 PROCEDURE — 96372 THER/PROPH/DIAG INJ SC/IM: CPT | Performed by: FAMILY MEDICINE

## 2024-03-20 RX ORDER — MEDROXYPROGESTERONE ACETATE 150 MG/ML
150 INJECTION, SUSPENSION INTRAMUSCULAR ONCE
Status: COMPLETED | OUTPATIENT
Start: 2024-03-20 | End: 2024-03-20

## 2024-03-20 RX ADMIN — MEDROXYPROGESTERONE ACETATE 150 MG: 150 INJECTION, SUSPENSION INTRAMUSCULAR at 11:45

## 2024-03-20 ASSESSMENT — PAIN SCALES - GENERAL: PAINLEVEL: 0-NO PAIN

## 2024-03-20 ASSESSMENT — PATIENT HEALTH QUESTIONNAIRE - PHQ9
1. LITTLE INTEREST OR PLEASURE IN DOING THINGS: NOT AT ALL
SUM OF ALL RESPONSES TO PHQ9 QUESTIONS 1 AND 2: 0
2. FEELING DOWN, DEPRESSED OR HOPELESS: NOT AT ALL

## 2024-03-20 NOTE — PROGRESS NOTES
Outpatient Visit Note    Chief Complaint   Patient presents with    Follow-up     6 month f/u         HPI:  Brittni Soto is a 38 y.o. female a past medical history significant for GERD, hypertension, depression/anxiety and vitamin-D deficiency presents to the office for 6-month follow-up.  She was last seen in the office on 8/31/2023 for an annual wellness exam and Depo shot.  Patient did have subsequent nurse visit in December for Depo shot.  Urine pregnancy was completed at that time which was negative    Last panel blood work completed on 9/5/2023 including vitamin D, TSH, lipid panel, A1c, CBC and CMP.  Blood work was remarkable for vitamin D deficiency and improved A1c outside of prediabetic range.    Overall, she describes her health as good with no reports of recent illness or hospitalization.    She had previously established relationship with OB/GYN who organizes her routine female health maintenance exams, though she has no active specialist as they have retired/left the area. Does have noted history of prior abnormal Pap. Has most recently been following with our office for Depo-Provera birth control. Is due for 3 month injection today states that regimen has been well tolerated with no acute concerns.        Prediabetes:  Denies any polyuria, polydipsia, polyphagia or acute vision/sensation changes.           Hypertension:  She reports continued compliance with hydrochlorothiazide and amlodipine. Does state to check blood pressure regularly at work with pressures typically in the 140-150s/90s. Denies any chest pain, shortness a breath, lightheadedness or dizziness.    Preventative Health Maintenance:  In regards to preventative health maintenance, last Tdap received in 2019. Flu shot not received for past season. Pneumonia vaccination series previously initiated with Pneumovax in 2019. COVID-19 vaccine series completed with patient currently due for booster.    At last encounter she stated that  her older sister was  diagnosed with breast cancer in February 2023, subsequently undergoing chemotherapy and recent surgery. Admitted to other family history of breast cancer.  Noted personal history of prior breast reduction surgery. States that she did develop keloids following her surgery.  Was ultimately given referral to establish with new OB/GYN to have normal breast exam and possibly linked up with breast specialist.  Was unable to line up with specialist though she still would like to pursue this and requests referral again today.    Current Medications  Current Outpatient Medications   Medication Instructions    amLODIPine (NORVASC) 5 mg, oral, Daily    hydroCHLOROthiazide (MICROZIDE) 12.5 mg, oral, Daily        Allergies  No Known Allergies     Past Medical History:   Diagnosis Date    Anxiety     Depression     GERD (gastroesophageal reflux disease)     Hypertension       History reviewed. No pertinent surgical history.  No family history on file.  Social History     Tobacco Use    Smoking status: Former     Types: Cigarettes    Smokeless tobacco: Never   Vaping Use    Vaping Use: Never used   Substance Use Topics    Alcohol use: Yes     Comment: occationally    Drug use: Never       ROS  All pertinent positive symptoms are included in the history of present illness.  All other systems have been reviewed and are negative and noncontributory to this patient's current ailments.    VITAL SIGNS  Vitals:    03/20/24 1113   BP: 136/86   Pulse: 87   Temp: 36.3 °C (97.4 °F)   SpO2: 99%       PHYSICAL EXAM  GENERAL APPEARANCE: alert and oriented, Pleasant and cooperative, No Acute Distress  HEENT: EOMI, PERRLA, MMM  HEART: RRR, normal S1S2, no murmurs, click or rubs  LUNGS: clear to auscultation bilaterally, no wheezes/rhonchi/rales  EXTREMITIES: no edema, normal ROM  SKIN: normal, no rash, unremarkable  NEUROLOGIC EXAM: non-focal exam  MUSCULOSKELETAL: no gross abnormalities  PSYCH: affect is normal, eye contact  is good    Assessment/Plan   Problem List Items Addressed This Visit             ICD-10-CM    Primary hypertension - Primary I10     - Blood pressure stable on current regimen  -We will continue with current medications without modification  -Continue to focus on healthy, balanced diet with moderation of salt/caffeine/alcohol         Relevant Orders    Comprehensive metabolic panel    Prediabetes R73.03     - Will monitor A1c sugar average noting significant improvement with last panel  -Continue to focus on healthy, low-fat diet with moderation of carbohydrates         Relevant Orders    Hemoglobin A1c    Vitamin D deficiency E55.9     - Will check vitamin D level today secondary to prior history of deficiency         Relevant Orders    Vitamin D 25-Hydroxy,Total (for eval of Vitamin D levels)    Family history of breast cancer Z80.3     - Secondary to strong family history, will plan to link up with the OB/GYN; referral given         Relevant Orders    Referral to Obstetrics / Gynecology     Other Visit Diagnoses         Codes    Encounter for surveillance of injectable contraceptive     Z30.42    Relevant Medications    medroxyPROGESTERone (Depo-Provera) injection 150 mg (Start on 3/20/2024 11:45 AM)    Other Relevant Orders    POCT Pregnancy, Urine manually resulted            Counseling:       Medication education:         Education:  The patient is counseled regarding potential side-effects of all new medications        Understanding:  Patient expressed understanding        Adherence:  No barriers to adherence identified

## 2024-03-20 NOTE — ASSESSMENT & PLAN NOTE
- Will monitor A1c sugar average noting significant improvement with last panel  -Continue to focus on healthy, low-fat diet with moderation of carbohydrates

## 2024-03-20 NOTE — PATIENT INSTRUCTIONS
Problem List Items Addressed This Visit             ICD-10-CM    Primary hypertension - Primary I10     - Blood pressure stable on current regimen  -We will continue with current medications without modification  -Continue to focus on healthy, balanced diet with moderation of salt/caffeine/alcohol         Relevant Orders    Comprehensive metabolic panel    Prediabetes R73.03     - Will monitor A1c sugar average noting significant improvement with last panel  -Continue to focus on healthy, low-fat diet with moderation of carbohydrates         Relevant Orders    Hemoglobin A1c    Vitamin D deficiency E55.9     - Will check vitamin D level today secondary to prior history of deficiency         Relevant Orders    Vitamin D 25-Hydroxy,Total (for eval of Vitamin D levels)    Family history of breast cancer Z80.3     - Secondary to strong family history, will plan to link up with the OB/GYN; referral given         Relevant Orders    Referral to Obstetrics / Gynecology     Other Visit Diagnoses         Codes    Encounter for surveillance of injectable contraceptive     Z30.42    Relevant Medications    medroxyPROGESTERone (Depo-Provera) injection 150 mg (Start on 3/20/2024 11:45 AM)    Other Relevant Orders    POCT Pregnancy, Urine manually resulted            Counseling:       Medication education:         Education:  The patient is counseled regarding potential side-effects of all new medications        Understanding:  Patient expressed understanding        Adherence:  No barriers to adherence identified

## 2024-03-20 NOTE — ASSESSMENT & PLAN NOTE
- Blood pressure stable on current regimen  -We will continue with current medications without modification  -Continue to focus on healthy, balanced diet with moderation of salt/caffeine/alcohol

## 2024-03-23 ENCOUNTER — HOSPITAL ENCOUNTER (EMERGENCY)
Facility: HOSPITAL | Age: 39
Discharge: HOME | End: 2024-03-23
Payer: COMMERCIAL

## 2024-03-23 DIAGNOSIS — L03.90 CELLULITIS, UNSPECIFIED CELLULITIS SITE: ICD-10-CM

## 2024-03-23 DIAGNOSIS — L02.419 ABSCESS, AXILLA: Primary | ICD-10-CM

## 2024-03-23 PROCEDURE — 99283 EMERGENCY DEPT VISIT LOW MDM: CPT | Mod: 25

## 2024-03-23 PROCEDURE — 2500000005 HC RX 250 GENERAL PHARMACY W/O HCPCS: Performed by: PHYSICIAN ASSISTANT

## 2024-03-23 PROCEDURE — 2500000001 HC RX 250 WO HCPCS SELF ADMINISTERED DRUGS (ALT 637 FOR MEDICARE OP): Performed by: PHYSICIAN ASSISTANT

## 2024-03-23 PROCEDURE — 10060 I&D ABSCESS SIMPLE/SINGLE: CPT | Performed by: PHYSICIAN ASSISTANT

## 2024-03-23 RX ORDER — LIDOCAINE HYDROCHLORIDE 10 MG/ML
20 INJECTION INFILTRATION; PERINEURAL ONCE
Status: COMPLETED | OUTPATIENT
Start: 2024-03-23 | End: 2024-03-23

## 2024-03-23 RX ORDER — CLINDAMYCIN HYDROCHLORIDE 300 MG/1
300 CAPSULE ORAL 4 TIMES DAILY
Qty: 40 CAPSULE | Refills: 0 | Status: SHIPPED | OUTPATIENT
Start: 2024-03-23 | End: 2024-04-02

## 2024-03-23 RX ADMIN — CLINDAMYCIN HYDROCHLORIDE 450 MG: 300 CAPSULE ORAL at 17:39

## 2024-03-23 RX ADMIN — LIDOCAINE HYDROCHLORIDE 200 MG: 10 INJECTION, SOLUTION INFILTRATION; PERINEURAL at 17:39

## 2024-03-23 ASSESSMENT — LIFESTYLE VARIABLES
HAVE YOU EVER FELT YOU SHOULD CUT DOWN ON YOUR DRINKING: NO
HAVE PEOPLE ANNOYED YOU BY CRITICIZING YOUR DRINKING: NO
TOTAL SCORE: 0
EVER FELT BAD OR GUILTY ABOUT YOUR DRINKING: NO
EVER HAD A DRINK FIRST THING IN THE MORNING TO STEADY YOUR NERVES TO GET RID OF A HANGOVER: NO

## 2024-03-23 NOTE — ED PROVIDER NOTES
HPI   No chief complaint on file.      38-year-old female presented emergency department with a chief complaint of abscess in her left axilla.  She has a history of hidradenitis suppurative.  Has had recurrent abscess in the past.  She is not diabetic.  She denies lightheadedness dizziness numbness weakness.  Denies chest pain shortness of breath.  Denies injury or trauma.  No other complaint.                          No data recorded                   Patient History   Past Medical History:   Diagnosis Date    Anxiety     Depression     GERD (gastroesophageal reflux disease)     Hypertension      No past surgical history on file.  No family history on file.  Social History     Tobacco Use    Smoking status: Former     Types: Cigarettes    Smokeless tobacco: Never   Vaping Use    Vaping Use: Never used   Substance Use Topics    Alcohol use: Yes     Comment: occationally    Drug use: Never       Physical Exam   ED Triage Vitals   Temp Pulse Resp BP   -- -- -- --      SpO2 Temp src Heart Rate Source Patient Position   -- -- -- --      BP Location FiO2 (%)     -- --       Physical Exam  Vitals and nursing note reviewed.   Constitutional:       Appearance: Normal appearance.   HENT:      Head: Normocephalic.      Mouth/Throat:      Mouth: Mucous membranes are moist.   Cardiovascular:      Rate and Rhythm: Normal rate and regular rhythm.   Pulmonary:      Effort: Pulmonary effort is normal.      Breath sounds: Normal breath sounds.   Abdominal:      General: Abdomen is flat.   Musculoskeletal:         General: Normal range of motion.      Cervical back: Normal range of motion.   Skin:     General: Skin is warm.      Comments: And left axilla there is evidence of a fluctuant abscess with surrounding cellulitic changes   Neurological:      General: No focal deficit present.      Mental Status: She is alert and oriented to person, place, and time.   Psychiatric:         Mood and Affect: Mood normal.         ED Course & MDM    Diagnoses as of 03/23/24 1727   Abscess, axilla   Cellulitis, unspecified cellulitis site       Medical Decision Making  I have seen and evaluated this patient.  Physician available for consultation.  Vital signs have been reviewed.  All laboratory and diagnostic imaging is reviewed by myself and interpreted by myself unless otherwise stated.  Additionally imaging is interpreted by radiologist.    Patient with a small left axilla abscess with surrounding cellulitis.  Needle drainage is performed with removal of purulent material.  Instructed warm compresses.  Placed on antibiotic.  Dressing placed.  Released in good condition with primary follow-up.    Labs Reviewed - No data to display  No orders to display  Medications  lidocaine (Xylocaine) 10 mg/mL (1 %) injection 200 mg (200 mg intravenous Given 3/23/24 1739)  clindamycin (Cleocin) capsule 450 mg (450 mg oral Given 3/23/24 1739)  Discharge Medication List as of 3/23/2024  5:28 PM    START taking these medications    clindamycin (Cleocin) 300 mg capsule  Take 1 capsule (300 mg) by mouth 4 times a day for 10 days., Starting Sat 3/23/2024, Until Tue 4/2/2024, Normal                  Procedure  Incision and Drainage    Performed by: Magno Paris PA-C  Authorized by: Magno Paris PA-C    Consent:     Consent obtained:  Verbal  Universal protocol:     Patient identity confirmed:  Verbally with patient  Location:     Type:  Abscess    Size:  1cm x 1cm    Location: left axilla.  Pre-procedure details:     Skin preparation:  Chlorhexidine  Sedation:     Sedation type:  None  Anesthesia:     Anesthesia method:  Local infiltration    Local anesthetic:  Lidocaine 1% w/o epi  Procedure type:     Complexity:  Simple  Procedure details:     Ultrasound guidance: no      Needle aspiration: yes      Needle size:  18 G    Incision types:  Stab incision    Incision depth:  Dermal    Drainage:  Purulent and bloody    Drainage amount:  Scant    Wound treatment:  Wound left  open    Packing materials:  None  Post-procedure details:     Procedure completion:  Tolerated       Magno Paris PA-C  03/23/24 [id number]

## 2024-03-23 NOTE — Clinical Note
Brittni Soto was seen and treated in our emergency department on 3/23/2024.  She may return to work on 03/26/2024.       If you have any questions or concerns, please don't hesitate to call.      Magno Paris PA-C

## 2024-06-12 DIAGNOSIS — I10 PRIMARY HYPERTENSION: ICD-10-CM

## 2024-06-13 RX ORDER — AMLODIPINE BESYLATE 5 MG/1
5 TABLET ORAL DAILY
Qty: 90 TABLET | Refills: 1 | Status: SHIPPED | OUTPATIENT
Start: 2024-06-13 | End: 2024-12-10

## 2024-06-13 RX ORDER — HYDROCHLOROTHIAZIDE 12.5 MG/1
12.5 TABLET ORAL DAILY
Qty: 90 TABLET | Refills: 1 | Status: SHIPPED | OUTPATIENT
Start: 2024-06-13 | End: 2024-12-10

## 2024-06-25 ENCOUNTER — CLINICAL SUPPORT (OUTPATIENT)
Dept: PRIMARY CARE | Facility: CLINIC | Age: 39
End: 2024-06-25
Payer: COMMERCIAL

## 2024-06-25 DIAGNOSIS — Z30.42 ENCOUNTER FOR SURVEILLANCE OF INJECTABLE CONTRACEPTIVE: Primary | ICD-10-CM

## 2024-06-25 LAB — PREGNANCY TEST URINE, POC: NEGATIVE

## 2024-06-25 PROCEDURE — 96372 THER/PROPH/DIAG INJ SC/IM: CPT | Performed by: NURSE PRACTITIONER

## 2024-06-25 PROCEDURE — 2500000004 HC RX 250 GENERAL PHARMACY W/ HCPCS (ALT 636 FOR OP/ED): Performed by: NURSE PRACTITIONER

## 2024-06-25 PROCEDURE — 81025 URINE PREGNANCY TEST: CPT | Mod: MUE | Performed by: NURSE PRACTITIONER

## 2024-06-25 PROCEDURE — 81025 URINE PREGNANCY TEST: CPT

## 2024-06-25 RX ORDER — MEDROXYPROGESTERONE ACETATE 150 MG/ML
150 INJECTION, SUSPENSION INTRAMUSCULAR ONCE
Status: COMPLETED | OUTPATIENT
Start: 2024-06-25 | End: 2024-06-25

## 2024-09-24 ENCOUNTER — LAB (OUTPATIENT)
Dept: LAB | Facility: LAB | Age: 39
End: 2024-09-24
Payer: COMMERCIAL

## 2024-09-24 ENCOUNTER — OFFICE VISIT (OUTPATIENT)
Dept: PRIMARY CARE | Facility: CLINIC | Age: 39
End: 2024-09-24
Payer: COMMERCIAL

## 2024-09-24 VITALS
SYSTOLIC BLOOD PRESSURE: 146 MMHG | OXYGEN SATURATION: 95 % | HEIGHT: 66 IN | BODY MASS INDEX: 32.78 KG/M2 | TEMPERATURE: 97.8 F | WEIGHT: 204 LBS | HEART RATE: 86 BPM | RESPIRATION RATE: 18 BRPM | DIASTOLIC BLOOD PRESSURE: 80 MMHG

## 2024-09-24 DIAGNOSIS — Z00.00 ANNUAL PHYSICAL EXAM: ICD-10-CM

## 2024-09-24 DIAGNOSIS — I10 PRIMARY HYPERTENSION: ICD-10-CM

## 2024-09-24 DIAGNOSIS — Z01.84 IMMUNITY STATUS TESTING: ICD-10-CM

## 2024-09-24 DIAGNOSIS — Z11.1 TUBERCULOSIS SCREENING: ICD-10-CM

## 2024-09-24 DIAGNOSIS — R73.03 PREDIABETES: ICD-10-CM

## 2024-09-24 DIAGNOSIS — Z30.42 ENCOUNTER FOR SURVEILLANCE OF INJECTABLE CONTRACEPTIVE: ICD-10-CM

## 2024-09-24 DIAGNOSIS — E55.9 VITAMIN D DEFICIENCY: ICD-10-CM

## 2024-09-24 DIAGNOSIS — Z00.00 ANNUAL PHYSICAL EXAM: Primary | ICD-10-CM

## 2024-09-24 PROBLEM — H40.003 GLAUCOMA SUSPECT OF BOTH EYES: Status: RESOLVED | Noted: 2023-12-08 | Resolved: 2024-09-24

## 2024-09-24 LAB
25(OH)D3 SERPL-MCNC: 24 NG/ML (ref 30–100)
ALBUMIN SERPL BCP-MCNC: 4.2 G/DL (ref 3.4–5)
ALP SERPL-CCNC: 56 U/L (ref 33–110)
ALT SERPL W P-5'-P-CCNC: 23 U/L (ref 7–45)
ANION GAP SERPL CALCULATED.3IONS-SCNC: 13 MMOL/L (ref 10–20)
AST SERPL W P-5'-P-CCNC: 16 U/L (ref 9–39)
BILIRUB SERPL-MCNC: 0.7 MG/DL (ref 0–1.2)
BUN SERPL-MCNC: 9 MG/DL (ref 6–23)
CALCIUM SERPL-MCNC: 9.5 MG/DL (ref 8.6–10.3)
CHLORIDE SERPL-SCNC: 107 MMOL/L (ref 98–107)
CHOLEST SERPL-MCNC: 160 MG/DL (ref 0–199)
CHOLEST/HDLC SERPL: 3.2 {RATIO}
CO2 SERPL-SCNC: 25 MMOL/L (ref 21–32)
CREAT SERPL-MCNC: 0.67 MG/DL (ref 0.5–1.05)
EGFRCR SERPLBLD CKD-EPI 2021: >90 ML/MIN/1.73M*2
EST. AVERAGE GLUCOSE BLD GHB EST-MCNC: 120 MG/DL
GLUCOSE SERPL-MCNC: 84 MG/DL (ref 74–99)
HBA1C MFR BLD: 5.8 %
HBV SURFACE AB SER-ACNC: >1000 MIU/ML
HDLC SERPL-MCNC: 50.5 MG/DL
LDLC SERPL CALC-MCNC: 85 MG/DL
MEV IGG SER QL IA: ABNORMAL
MUMPS IGG ANTIBODY INDEX: 3.7 IA
MUV IGG SER IA-ACNC: POSITIVE
NON HDL CHOLESTEROL: 110 MG/DL (ref 0–149)
POTASSIUM SERPL-SCNC: 3.7 MMOL/L (ref 3.5–5.3)
PREGNANCY TEST URINE, POC: NEGATIVE
PROT SERPL-MCNC: 6.8 G/DL (ref 6.4–8.2)
RUBEOLA IGG ANTIBODY INDEX: 1 IA
RUBV IGG SERPL IA-ACNC: 2.2 IA
RUBV IGG SERPL QL IA: POSITIVE
SODIUM SERPL-SCNC: 141 MMOL/L (ref 136–145)
TRIGL SERPL-MCNC: 121 MG/DL (ref 0–149)
VARICELLA ZOSTER IGG INDEX: 3.1 IA
VLDL: 24 MG/DL (ref 0–40)
VZV IGG SER QL IA: POSITIVE

## 2024-09-24 PROCEDURE — 86765 RUBEOLA ANTIBODY: CPT

## 2024-09-24 PROCEDURE — 3079F DIAST BP 80-89 MM HG: CPT | Performed by: NURSE PRACTITIONER

## 2024-09-24 PROCEDURE — 3077F SYST BP >= 140 MM HG: CPT | Performed by: NURSE PRACTITIONER

## 2024-09-24 PROCEDURE — G0439 PPPS, SUBSEQ VISIT: HCPCS | Performed by: NURSE PRACTITIONER

## 2024-09-24 PROCEDURE — 86317 IMMUNOASSAY INFECTIOUS AGENT: CPT

## 2024-09-24 PROCEDURE — 81025 URINE PREGNANCY TEST: CPT | Performed by: NURSE PRACTITIONER

## 2024-09-24 PROCEDURE — 83036 HEMOGLOBIN GLYCOSYLATED A1C: CPT

## 2024-09-24 PROCEDURE — 3008F BODY MASS INDEX DOCD: CPT | Performed by: NURSE PRACTITIONER

## 2024-09-24 PROCEDURE — 86481 TB AG RESPONSE T-CELL SUSP: CPT

## 2024-09-24 PROCEDURE — G0008 ADMIN INFLUENZA VIRUS VAC: HCPCS | Performed by: NURSE PRACTITIONER

## 2024-09-24 PROCEDURE — 36415 COLL VENOUS BLD VENIPUNCTURE: CPT

## 2024-09-24 PROCEDURE — 86735 MUMPS ANTIBODY: CPT

## 2024-09-24 PROCEDURE — 2500000004 HC RX 250 GENERAL PHARMACY W/ HCPCS (ALT 636 FOR OP/ED): Performed by: NURSE PRACTITIONER

## 2024-09-24 PROCEDURE — 86706 HEP B SURFACE ANTIBODY: CPT

## 2024-09-24 PROCEDURE — 1036F TOBACCO NON-USER: CPT | Performed by: NURSE PRACTITIONER

## 2024-09-24 PROCEDURE — 99215 OFFICE O/P EST HI 40 MIN: CPT | Mod: 25 | Performed by: NURSE PRACTITIONER

## 2024-09-24 PROCEDURE — 96372 THER/PROPH/DIAG INJ SC/IM: CPT | Performed by: NURSE PRACTITIONER

## 2024-09-24 PROCEDURE — 99395 PREV VISIT EST AGE 18-39: CPT | Performed by: NURSE PRACTITIONER

## 2024-09-24 PROCEDURE — 82306 VITAMIN D 25 HYDROXY: CPT

## 2024-09-24 PROCEDURE — 86787 VARICELLA-ZOSTER ANTIBODY: CPT

## 2024-09-24 RX ORDER — AMLODIPINE BESYLATE 5 MG/1
5 TABLET ORAL DAILY
Qty: 90 TABLET | Refills: 1 | Status: SHIPPED | OUTPATIENT
Start: 2024-09-24 | End: 2025-03-23

## 2024-09-24 RX ORDER — MEDROXYPROGESTERONE ACETATE 150 MG/ML
150 INJECTION, SUSPENSION INTRAMUSCULAR ONCE
Status: COMPLETED | OUTPATIENT
Start: 2024-09-24 | End: 2024-09-24

## 2024-09-24 RX ORDER — HYDROCHLOROTHIAZIDE 12.5 MG/1
12.5 TABLET ORAL DAILY
Qty: 90 TABLET | Refills: 1 | Status: SHIPPED | OUTPATIENT
Start: 2024-09-24 | End: 2025-03-23

## 2024-09-24 ASSESSMENT — ENCOUNTER SYMPTOMS
RESPIRATORY NEGATIVE: 1
CONSTITUTIONAL NEGATIVE: 1
NEUROLOGICAL NEGATIVE: 1
MUSCULOSKELETAL NEGATIVE: 1
ALLERGIC/IMMUNOLOGIC NEGATIVE: 1
PSYCHIATRIC NEGATIVE: 1
GASTROINTESTINAL NEGATIVE: 1
EYES NEGATIVE: 1
HEMATOLOGIC/LYMPHATIC NEGATIVE: 1
CARDIOVASCULAR NEGATIVE: 1
ENDOCRINE NEGATIVE: 1

## 2024-09-24 ASSESSMENT — LIFESTYLE VARIABLES
HAS A RELATIVE, FRIEND, DOCTOR, OR ANOTHER HEALTH PROFESSIONAL EXPRESSED CONCERN ABOUT YOUR DRINKING OR SUGGESTED YOU CUT DOWN: NO
HOW OFTEN DO YOU HAVE SIX OR MORE DRINKS ON ONE OCCASION: NEVER
HOW MANY STANDARD DRINKS CONTAINING ALCOHOL DO YOU HAVE ON A TYPICAL DAY: 1 OR 2
HOW OFTEN DO YOU HAVE A DRINK CONTAINING ALCOHOL: 2-4 TIMES A MONTH
SKIP TO QUESTIONS 9-10: 1
AUDIT-C TOTAL SCORE: 2

## 2024-09-24 ASSESSMENT — PAIN SCALES - GENERAL: PAINLEVEL: 0-NO PAIN

## 2024-09-24 ASSESSMENT — PATIENT HEALTH QUESTIONNAIRE - PHQ9
2. FEELING DOWN, DEPRESSED OR HOPELESS: NOT AT ALL
1. LITTLE INTEREST OR PLEASURE IN DOING THINGS: NOT AT ALL
SUM OF ALL RESPONSES TO PHQ9 QUESTIONS 1 AND 2: 0

## 2024-09-24 NOTE — PROGRESS NOTES
"Chief Complaint  Brittni Soto is a 38 y.o. female presenting for \"Follow-up and Annual Exam (NEEDS DEPO SHOT/TITERS FOR SCHOOL/FLU SHOT).\"    HPI     Brittni Soto is a 38 y.o. female presenting for A SCHOOL PHYSICAL AND LABS, NEEDS TITERS, SHE ALSO NEEDS HER DEPO SHOT, HAS BEEN OVER THREE MONTHS, SHE HAS A STRONG FAMILY HX OF BREAST CANCER, SISTER, MOM AND MATERNAL AUNT.      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     Social History  She reports that she has quit smoking. Her smoking use included cigarettes. She has been exposed to tobacco smoke. She has never used smokeless tobacco. She reports current alcohol use. She reports that she does not use drugs.    Family History  No family history on file.     Allergies  Patient has no known allergies.    ROS  Review of Systems   Constitutional: Negative.    HENT: Negative.     Eyes: Negative.    Respiratory: Negative.     Cardiovascular: Negative.    Gastrointestinal: Negative.    Endocrine: Negative.    Genitourinary: Negative.    Musculoskeletal: Negative.    Skin: Negative.    Allergic/Immunologic: Negative.    Neurological: Negative.    Hematological: Negative.    Psychiatric/Behavioral: Negative.          Last Recorded Vitals  /80 (BP Location: Left arm, Patient Position: " Sitting, BP Cuff Size: Adult)   Pulse 86   Temp 36.6 °C (97.8 °F)   Resp 18   Wt 92.5 kg (204 lb)   SpO2 95%     Physical Exam  Vitals and nursing note reviewed.   Constitutional:       Appearance: Normal appearance.   HENT:      Head: Normocephalic and atraumatic.      Right Ear: Tympanic membrane, ear canal and external ear normal.      Left Ear: Tympanic membrane, ear canal and external ear normal.      Nose: Nose normal.      Mouth/Throat:      Mouth: Mucous membranes are moist.      Pharynx: Oropharynx is clear.   Eyes:      Extraocular Movements: Extraocular movements intact.      Conjunctiva/sclera: Conjunctivae normal.      Pupils: Pupils are equal, round, and reactive to light.   Neck:      Thyroid: No thyromegaly.   Cardiovascular:      Rate and Rhythm: Normal rate and regular rhythm.      Pulses: Normal pulses.      Heart sounds: Normal heart sounds, S1 normal and S2 normal.   Pulmonary:      Effort: Pulmonary effort is normal.      Breath sounds: Normal breath sounds. No wheezing or rhonchi.   Abdominal:      General: Bowel sounds are normal.      Palpations: Abdomen is soft. There is no mass.      Tenderness: There is no abdominal tenderness. There is no guarding.   Genitourinary:     Comments: Not examined  Musculoskeletal:         General: Normal range of motion.      Cervical back: Normal range of motion.      Right lower leg: No edema.      Left lower leg: No edema.   Lymphadenopathy:      Cervical: No cervical adenopathy.   Skin:     General: Skin is warm and dry.      Capillary Refill: Capillary refill takes less than 2 seconds.      Findings: No rash.   Neurological:      General: No focal deficit present.      Mental Status: She is alert and oriented to person, place, and time. Mental status is at baseline.      Cranial Nerves: Cranial nerves 2-12 are intact. No cranial nerve deficit.      Sensory: Sensation is intact.      Motor: Motor function is intact.      Coordination: Coordination is  intact.      Gait: Gait is intact.   Psychiatric:         Mood and Affect: Mood normal.         Behavior: Behavior normal.         Thought Content: Thought content normal.         Judgment: Judgment normal.         Relevant Results      Assessment/Plan   Brittni was seen today for follow-up and annual exam.  Diagnoses and all orders for this visit:  Annual physical exam (Primary)  -     Comprehensive Metabolic Panel; Future  -     Lipid Panel; Future  Primary hypertension  -     amLODIPine (Norvasc) 5 mg tablet; Take 1 tablet (5 mg) by mouth once daily.  -     hydroCHLOROthiazide (Microzide) 12.5 mg tablet; Take 1 tablet (12.5 mg) by mouth once daily.  Immunity status testing  -     Rubeola Antibody, IgG; Future  -     Mumps Antibody, IgG; Future  -     Rubella Antibody, IgG; Future  -     Varicella Zoster Antibody, IgG; Future  -     Hepatitis B Surface Antibody; Future  Tuberculosis screening  -     T-Spot TB; Future  Encounter for surveillance of injectable contraceptive  -     medroxyPROGESTERone (Depo-Provera) injection 150 mg  -     POCT pregnancy, urine manually resulted  Other orders  -     Flu vaccine, trivalent, preservative free, age 6 months and greater (Fluraix/Fluzone/Flulaval)          COUNSELING      Medication education:              Education:  The patient is counseled regarding potential side-effects of any and all new medications             Understanding:  Patient expressed understanding             Adherence:  No barriers to adherence identified        Amarilis Gong, APRN-CNP

## 2024-09-25 NOTE — RESULT ENCOUNTER NOTE
All titers pos except rubeola, pt needs one MMR booster vit d is low take 2000 units daily, A1C shows prediabetes, follow  a low carb low sugar diet exercise daily, work on weight loss.     Schedule for mmr

## 2024-09-26 LAB
NIL(NEG) CONTROL SPOT COUNT: NORMAL
PANEL A SPOT COUNT: 0
PANEL B SPOT COUNT: 0
POS CONTROL SPOT COUNT: NORMAL
T-SPOT. TB INTERPRETATION: NEGATIVE

## 2024-10-17 ENCOUNTER — TELEPHONE (OUTPATIENT)
Dept: PRIMARY CARE | Facility: CLINIC | Age: 39
End: 2024-10-17
Payer: COMMERCIAL

## 2024-10-17 ENCOUNTER — CLINICAL SUPPORT (OUTPATIENT)
Dept: PRIMARY CARE | Facility: CLINIC | Age: 39
End: 2024-10-17
Payer: COMMERCIAL

## 2024-10-17 DIAGNOSIS — Z23 ENCOUNTER FOR IMMUNIZATION: ICD-10-CM

## 2024-10-17 PROCEDURE — 90707 MMR VACCINE SC: CPT | Performed by: NURSE PRACTITIONER

## 2024-10-17 NOTE — TELEPHONE ENCOUNTER
Per prev lab results:     Amarilis MEG Gong, APRN-CNP  9/25/2024  8:44 AM EDT       All titers pos except rubeola, pt needs one MMR booster vit d is low take 2000 units daily, A1C shows prediabetes, follow  a low carb low sugar diet exercise daily, work on weight loss.     Schedule for mmr

## 2024-10-23 ENCOUNTER — TELEPHONE (OUTPATIENT)
Dept: PRIMARY CARE | Facility: CLINIC | Age: 39
End: 2024-10-23
Payer: COMMERCIAL

## 2024-10-23 NOTE — TELEPHONE ENCOUNTER
PT HAD A PHYSICAL LAST WEEK NEEDS COPY OF THIS FOR HER CLINICALS ASAP STATES SHE CAN  or email. Pt requesting a call when this has been done

## 2024-10-30 ENCOUNTER — TELEPHONE (OUTPATIENT)
Dept: PRIMARY CARE | Facility: CLINIC | Age: 39
End: 2024-10-30
Payer: COMMERCIAL

## 2024-12-13 ENCOUNTER — APPOINTMENT (OUTPATIENT)
Dept: PRIMARY CARE | Facility: CLINIC | Age: 39
End: 2024-12-13
Payer: COMMERCIAL

## 2024-12-24 ENCOUNTER — CLINICAL SUPPORT (OUTPATIENT)
Dept: PRIMARY CARE | Facility: CLINIC | Age: 39
End: 2024-12-24
Payer: COMMERCIAL

## 2024-12-24 DIAGNOSIS — Z30.42 ENCOUNTER FOR SURVEILLANCE OF INJECTABLE CONTRACEPTIVE: Primary | ICD-10-CM

## 2024-12-24 LAB — PREGNANCY TEST URINE, POC: NEGATIVE

## 2024-12-24 PROCEDURE — 81025 URINE PREGNANCY TEST: CPT | Performed by: NURSE PRACTITIONER

## 2024-12-24 PROCEDURE — 81025 URINE PREGNANCY TEST: CPT

## 2024-12-24 PROCEDURE — 96372 THER/PROPH/DIAG INJ SC/IM: CPT | Performed by: NURSE PRACTITIONER

## 2024-12-24 PROCEDURE — 2500000004 HC RX 250 GENERAL PHARMACY W/ HCPCS (ALT 636 FOR OP/ED): Performed by: NURSE PRACTITIONER

## 2024-12-24 RX ORDER — MEDROXYPROGESTERONE ACETATE 150 MG/ML
150 INJECTION, SUSPENSION INTRAMUSCULAR ONCE
Status: COMPLETED | OUTPATIENT
Start: 2024-12-24 | End: 2024-12-24

## 2024-12-30 ENCOUNTER — TELEMEDICINE (OUTPATIENT)
Dept: PRIMARY CARE | Facility: CLINIC | Age: 39
End: 2024-12-30
Payer: COMMERCIAL

## 2024-12-30 ENCOUNTER — APPOINTMENT (OUTPATIENT)
Dept: PRIMARY CARE | Facility: CLINIC | Age: 39
End: 2024-12-30
Payer: COMMERCIAL

## 2024-12-30 DIAGNOSIS — R41.840 ATTENTION DEFICIT: Primary | ICD-10-CM

## 2024-12-30 PROCEDURE — 1036F TOBACCO NON-USER: CPT | Performed by: NURSE PRACTITIONER

## 2024-12-30 PROCEDURE — G2211 COMPLEX E/M VISIT ADD ON: HCPCS | Performed by: NURSE PRACTITIONER

## 2024-12-30 PROCEDURE — 99214 OFFICE O/P EST MOD 30 MIN: CPT | Performed by: NURSE PRACTITIONER

## 2024-12-30 ASSESSMENT — PAIN SCALES - GENERAL: PAINLEVEL_OUTOF10: 0-NO PAIN

## 2024-12-30 ASSESSMENT — LIFESTYLE VARIABLES
SKIP TO QUESTIONS 9-10: 0
HOW OFTEN DO YOU HAVE SIX OR MORE DRINKS ON ONE OCCASION: NEVER
HOW MANY STANDARD DRINKS CONTAINING ALCOHOL DO YOU HAVE ON A TYPICAL DAY: 3 OR 4
HOW OFTEN DO YOU HAVE A DRINK CONTAINING ALCOHOL: MONTHLY OR LESS
AUDIT-C TOTAL SCORE: 2

## 2024-12-30 NOTE — PROGRESS NOTES
With patient's permission this is a telemedicine visit with video and audio.  History Of Present Illness  Brittni Soto is a 39 y.o. female who calls in for Discuss difficulty focusing (October 2024- is when pt first noticed when she started school.).    HPI pt is a 39 year old female who is having issues with anxiety.  Feels like she is zoning out especially when she is trying to take tests noticed it about a month ago she is currently in school, she would like to be tested for ADHD we will send her to psych explained how the medication works referral given    Past Medical History  She has a past medical history of Anxiety, Depression, GERD (gastroesophageal reflux disease), and Hypertension.    Medications  Current Outpatient Medications   Medication Instructions    amLODIPine (NORVASC) 5 mg, oral, Daily    hydroCHLOROthiazide (MICROZIDE) 12.5 mg, oral, Daily        Surgical History  She has no past surgical history on file.     Social History  She reports that she has quit smoking. Her smoking use included cigarettes. She has been exposed to tobacco smoke. She has never used smokeless tobacco. She reports current alcohol use. She reports that she does not use drugs.    Family History  No family history on file.     Allergies  Patient has no known allergies.    On video:     Appearance: Normal appearance.      Effort: Respiratory effort is normal. Speaking in full sentences. No respiratory distress.     Mood and Affect: Mood normal.         Thought Content: Thought content normal.         Judgment: Judgment normal.      Last Recorded Vitals  There were no vitals taken for this visit.  (Vitals are taken by patient at home, if any reported)    Relevant Results      Assessment/Plan   There are no diagnoses linked to this encounter.        Counseling    Medication education:              Education:  The patient is counseled regarding potential side-effects of any and all new medications             Understanding:   Patient expressed understanding             Adherence:  No barriers to adherence identified      Amarilis Gong, APRN-CNP

## 2025-03-11 ENCOUNTER — APPOINTMENT (OUTPATIENT)
Dept: PRIMARY CARE | Facility: CLINIC | Age: 40
End: 2025-03-11
Payer: COMMERCIAL

## 2025-03-12 ENCOUNTER — CLINICAL SUPPORT (OUTPATIENT)
Dept: PRIMARY CARE | Facility: CLINIC | Age: 40
End: 2025-03-12
Payer: COMMERCIAL

## 2025-03-12 DIAGNOSIS — Z30.42 ENCOUNTER FOR SURVEILLANCE OF INJECTABLE CONTRACEPTIVE: ICD-10-CM

## 2025-03-12 PROCEDURE — 96372 THER/PROPH/DIAG INJ SC/IM: CPT | Performed by: NURSE PRACTITIONER

## 2025-03-12 PROCEDURE — 2500000004 HC RX 250 GENERAL PHARMACY W/ HCPCS (ALT 636 FOR OP/ED): Performed by: NURSE PRACTITIONER

## 2025-03-12 RX ORDER — MEDROXYPROGESTERONE ACETATE 150 MG/ML
150 INJECTION, SUSPENSION INTRAMUSCULAR ONCE
Status: COMPLETED | OUTPATIENT
Start: 2025-03-12 | End: 2025-03-12

## 2025-03-12 RX ADMIN — MEDROXYPROGESTERONE ACETATE 150 MG: 150 INJECTION, SUSPENSION INTRAMUSCULAR at 15:54

## 2025-03-12 NOTE — PROGRESS NOTES
Patient came in for nurse visit for depo birth control shot and scheduled for next shot. Tolerated well.

## 2025-03-25 ENCOUNTER — APPOINTMENT (OUTPATIENT)
Dept: BEHAVIORAL HEALTH | Facility: CLINIC | Age: 40
End: 2025-03-25
Payer: COMMERCIAL

## 2025-03-25 DIAGNOSIS — R41.840 ATTENTION DEFICIT: ICD-10-CM

## 2025-03-25 DIAGNOSIS — F41.9 ANXIETY AND DEPRESSION: ICD-10-CM

## 2025-03-25 DIAGNOSIS — F32.A ANXIETY AND DEPRESSION: ICD-10-CM

## 2025-03-25 PROCEDURE — 90791 PSYCH DIAGNOSTIC EVALUATION: CPT | Performed by: PSYCHOLOGIST
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
## 2025-04-01 ENCOUNTER — APPOINTMENT (OUTPATIENT)
Dept: BEHAVIORAL HEALTH | Facility: CLINIC | Age: 40
End: 2025-04-01
Payer: COMMERCIAL

## 2025-04-01 DIAGNOSIS — F41.9 ANXIETY AND DEPRESSION: ICD-10-CM

## 2025-04-01 DIAGNOSIS — F32.A ANXIETY AND DEPRESSION: ICD-10-CM

## 2025-04-01 PROCEDURE — 90834 PSYTX W PT 45 MINUTES: CPT | Performed by: PSYCHOLOGIST
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
## 2025-04-08 ENCOUNTER — APPOINTMENT (OUTPATIENT)
Dept: BEHAVIORAL HEALTH | Facility: CLINIC | Age: 40
End: 2025-04-08
Payer: COMMERCIAL

## 2025-05-07 ENCOUNTER — APPOINTMENT (OUTPATIENT)
Dept: PRIMARY CARE | Facility: CLINIC | Age: 40
End: 2025-05-07
Payer: COMMERCIAL

## 2025-05-09 ENCOUNTER — APPOINTMENT (OUTPATIENT)
Dept: BEHAVIORAL HEALTH | Facility: CLINIC | Age: 40
End: 2025-05-09
Payer: COMMERCIAL

## 2025-05-09 DIAGNOSIS — F32.A ANXIETY AND DEPRESSION: ICD-10-CM

## 2025-05-09 DIAGNOSIS — F41.9 ANXIETY AND DEPRESSION: ICD-10-CM

## 2025-05-09 DIAGNOSIS — F90.0 ATTENTION DEFICIT HYPERACTIVITY DISORDER (ADHD), PREDOMINANTLY INATTENTIVE TYPE: ICD-10-CM

## 2025-05-09 PROCEDURE — 90834 PSYTX W PT 45 MINUTES: CPT | Performed by: PSYCHOLOGIST
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
## 2025-05-15 ENCOUNTER — APPOINTMENT (OUTPATIENT)
Dept: PRIMARY CARE | Facility: CLINIC | Age: 40
End: 2025-05-15
Payer: COMMERCIAL

## 2025-05-15 VITALS
SYSTOLIC BLOOD PRESSURE: 130 MMHG | OXYGEN SATURATION: 97 % | HEIGHT: 66 IN | DIASTOLIC BLOOD PRESSURE: 86 MMHG | BODY MASS INDEX: 33.14 KG/M2 | HEART RATE: 100 BPM | TEMPERATURE: 96.9 F | WEIGHT: 206.2 LBS

## 2025-05-15 DIAGNOSIS — F90.0 ATTENTION DEFICIT HYPERACTIVITY DISORDER (ADHD), PREDOMINANTLY INATTENTIVE TYPE: Primary | ICD-10-CM

## 2025-05-15 DIAGNOSIS — I10 PRIMARY HYPERTENSION: ICD-10-CM

## 2025-05-15 PROCEDURE — 1036F TOBACCO NON-USER: CPT | Performed by: NURSE PRACTITIONER

## 2025-05-15 PROCEDURE — 3008F BODY MASS INDEX DOCD: CPT | Performed by: NURSE PRACTITIONER

## 2025-05-15 PROCEDURE — 3079F DIAST BP 80-89 MM HG: CPT | Performed by: NURSE PRACTITIONER

## 2025-05-15 PROCEDURE — 99214 OFFICE O/P EST MOD 30 MIN: CPT | Performed by: NURSE PRACTITIONER

## 2025-05-15 PROCEDURE — 3075F SYST BP GE 130 - 139MM HG: CPT | Performed by: NURSE PRACTITIONER

## 2025-05-15 RX ORDER — HYDROCHLOROTHIAZIDE 12.5 MG/1
12.5 TABLET ORAL DAILY
Qty: 90 TABLET | Refills: 1 | Status: SHIPPED | OUTPATIENT
Start: 2025-05-15 | End: 2025-11-11

## 2025-05-15 RX ORDER — AMLODIPINE BESYLATE 5 MG/1
5 TABLET ORAL DAILY
Qty: 90 TABLET | Refills: 1 | Status: SHIPPED | OUTPATIENT
Start: 2025-05-15 | End: 2025-11-11

## 2025-05-15 ASSESSMENT — ENCOUNTER SYMPTOMS
MUSCULOSKELETAL NEGATIVE: 1
CARDIOVASCULAR NEGATIVE: 1
CONSTITUTIONAL NEGATIVE: 1
GASTROINTESTINAL NEGATIVE: 1
RESPIRATORY NEGATIVE: 1

## 2025-05-15 ASSESSMENT — LIFESTYLE VARIABLES
SKIP TO QUESTIONS 9-10: 1
HOW OFTEN DO YOU HAVE A DRINK CONTAINING ALCOHOL: MONTHLY OR LESS
HOW OFTEN DO YOU HAVE SIX OR MORE DRINKS ON ONE OCCASION: NEVER
AUDIT-C TOTAL SCORE: 1
HOW MANY STANDARD DRINKS CONTAINING ALCOHOL DO YOU HAVE ON A TYPICAL DAY: 1 OR 2

## 2025-05-15 ASSESSMENT — PATIENT HEALTH QUESTIONNAIRE - PHQ9
10. IF YOU CHECKED OFF ANY PROBLEMS, HOW DIFFICULT HAVE THESE PROBLEMS MADE IT FOR YOU TO DO YOUR WORK, TAKE CARE OF THINGS AT HOME, OR GET ALONG WITH OTHER PEOPLE: SOMEWHAT DIFFICULT
2. FEELING DOWN, DEPRESSED OR HOPELESS: SEVERAL DAYS
1. LITTLE INTEREST OR PLEASURE IN DOING THINGS: SEVERAL DAYS
SUM OF ALL RESPONSES TO PHQ9 QUESTIONS 1 AND 2: 2

## 2025-05-15 NOTE — PROGRESS NOTES
"Chief Complaint  Brittni Soto is a 39 y.o. female presenting for \"Med Refill.\"    Med Refill         Brittni Soto is a 39 y.o. female presenting for her 6-month follow-up patient has a history of depression and anxiety sees psych for this she has gastro esophageal reflux prediabetic vitamin D deficiency family history of breast cancer primary hypertension she is on amlodipine 5 mg and hydrochlorothiazide 12.5 needs refills today will not do lab work until her physical as the last labs were good of note the patient was supposed to have a 6-month follow-up on an abnormal mammogram 3 years ago and never followed up at this we will put in a diagnostic mammogram for her to get this done      Past Medical History  Patient Active Problem List    Diagnosis Date Noted    Depression with anxiety 03/20/2024    Gastroesophageal reflux disease 03/20/2024    Prediabetes 03/20/2024    Vitamin D deficiency 03/20/2024    Family history of breast cancer 03/20/2024    Keratoconus of both eyes 12/08/2023    Myopia, bilateral 12/08/2023    Primary hypertension 11/28/2023    Hidradenitis suppurativa 05/12/2020        Medications  Current Outpatient Medications   Medication Instructions    amLODIPine (NORVASC) 5 mg, oral, Daily    hydroCHLOROthiazide (MICROZIDE) 12.5 mg, oral, Daily        Surgical History  She has no past surgical history on file.     Social History  She reports that she has quit smoking. Her smoking use included cigars and cigarettes. She has a 5 pack-year smoking history. She has been exposed to tobacco smoke. She has never used smokeless tobacco. She reports current alcohol use. She reports that she does not use drugs.    Family History  Family History[1]     Allergies  Patient has no known allergies.    ROS  Review of Systems   Constitutional: Negative.    Respiratory: Negative.     Cardiovascular: Negative.    Gastrointestinal: Negative.    Genitourinary: Negative.    Musculoskeletal: Negative.         Last " Recorded Vitals  /86   Pulse 100   Temp 36.1 °C (96.9 °F)   Wt 93.5 kg (206 lb 3.2 oz)   SpO2 97%     Physical Exam  Vitals and nursing note reviewed.   Constitutional:       Appearance: Normal appearance.   Eyes:      Pupils: Pupils are equal, round, and reactive to light.   Cardiovascular:      Rate and Rhythm: Normal rate and regular rhythm.      Pulses: Normal pulses.      Heart sounds: Normal heart sounds.   Pulmonary:      Effort: Pulmonary effort is normal.      Breath sounds: Normal breath sounds.   Neurological:      Mental Status: She is alert.         Relevant Results      Assessment/Plan   Brittni was seen today for med refill.  Diagnoses and all orders for this visit:  Attention deficit hyperactivity disorder (ADHD), predominantly inattentive type (Primary)  -     Referral to Psychiatry; Future  Primary hypertension  -     amLODIPine (Norvasc) 5 mg tablet; Take 1 tablet (5 mg) by mouth once daily.  -     hydroCHLOROthiazide (Microzide) 12.5 mg tablet; Take 1 tablet (12.5 mg) by mouth once daily.  -     BI mammo bilateral diagnostic; Future          COUNSELING      Medication education:              Education:  The patient is counseled regarding potential side-effects of any and all new medications             Understanding:  Patient expressed understanding             Adherence:  No barriers to adherence identified        Amarilis Gong, APRN-CNP              [1] No family history on file.

## 2025-05-15 NOTE — LETTER
To whom it may concern,    The above patient is under my care and is being treated for anxiety and diagnosed for ADHD.  She would benefit from the following:    One hour extra time for testing along with private testing space.    If you have any questions you can contact our office at [phone]      Thank you,      Amarilis Gong CNP

## 2025-05-22 ENCOUNTER — APPOINTMENT (OUTPATIENT)
Dept: RADIOLOGY | Facility: HOSPITAL | Age: 40
End: 2025-05-22
Payer: COMMERCIAL

## 2025-05-28 ENCOUNTER — CLINICAL SUPPORT (OUTPATIENT)
Dept: PRIMARY CARE | Facility: CLINIC | Age: 40
End: 2025-05-28
Payer: COMMERCIAL

## 2025-05-28 DIAGNOSIS — Z30.42 ENCOUNTER FOR SURVEILLANCE OF INJECTABLE CONTRACEPTIVE: ICD-10-CM

## 2025-05-28 RX ORDER — MEDROXYPROGESTERONE ACETATE 150 MG/ML
150 INJECTION, SUSPENSION INTRAMUSCULAR ONCE
Status: COMPLETED | OUTPATIENT
Start: 2025-05-28 | End: 2025-05-28

## 2025-05-28 RX ADMIN — MEDROXYPROGESTERONE ACETATE 150 MG: 150 INJECTION, SUSPENSION INTRAMUSCULAR at 15:25

## 2025-05-28 NOTE — PROGRESS NOTES
Patient came in for depo birth control shot. Tolerate well. Next depo due 8/13-8/28. Patient scheduled for next depo shot.

## 2025-07-01 ENCOUNTER — APPOINTMENT (OUTPATIENT)
Dept: BEHAVIORAL HEALTH | Facility: CLINIC | Age: 40
End: 2025-07-01
Payer: COMMERCIAL

## 2025-08-05 ENCOUNTER — APPOINTMENT (OUTPATIENT)
Dept: BEHAVIORAL HEALTH | Facility: CLINIC | Age: 40
End: 2025-08-05
Payer: COMMERCIAL

## 2025-08-05 DIAGNOSIS — F32.A ANXIETY AND DEPRESSION: ICD-10-CM

## 2025-08-05 DIAGNOSIS — F90.0 ATTENTION DEFICIT HYPERACTIVITY DISORDER (ADHD), PREDOMINANTLY INATTENTIVE TYPE: ICD-10-CM

## 2025-08-05 DIAGNOSIS — F41.9 ANXIETY AND DEPRESSION: ICD-10-CM

## 2025-08-05 DIAGNOSIS — Z91.89 COMPLIANCE WITH MEDICATION REGIMEN: ICD-10-CM

## 2025-08-05 PROCEDURE — 99205 OFFICE O/P NEW HI 60 MIN: CPT

## 2025-08-05 RX ORDER — SERTRALINE HYDROCHLORIDE 50 MG/1
TABLET, FILM COATED ORAL
Qty: 34 TABLET | Refills: 1 | Status: SHIPPED | OUTPATIENT
Start: 2025-08-05 | End: 2025-09-11

## 2025-08-05 ASSESSMENT — PATIENT HEALTH QUESTIONNAIRE - PHQ9
SUM OF ALL RESPONSES TO PHQ9 QUESTIONS 1 & 2: 2
4. FEELING TIRED OR HAVING LITTLE ENERGY: NOT AT ALL
2. FEELING DOWN, DEPRESSED OR HOPELESS: SEVERAL DAYS
1. LITTLE INTEREST OR PLEASURE IN DOING THINGS: SEVERAL DAYS
8. MOVING OR SPEAKING SO SLOWLY THAT OTHER PEOPLE COULD HAVE NOTICED. OR THE OPPOSITE, BEING SO FIGETY OR RESTLESS THAT YOU HAVE BEEN MOVING AROUND A LOT MORE THAN USUAL: NOT AT ALL
3. TROUBLE FALLING OR STAYING ASLEEP: SEVERAL DAYS
5. POOR APPETITE OR OVEREATING: SEVERAL DAYS
SUM OF ALL RESPONSES TO PHQ QUESTIONS 1-9: 8
6. FEELING BAD ABOUT YOURSELF - OR THAT YOU ARE A FAILURE OR HAVE LET YOURSELF OR YOUR FAMILY DOWN: SEVERAL DAYS
9. THOUGHTS THAT YOU WOULD BE BETTER OFF DEAD, OR OF HURTING YOURSELF: NOT AT ALL
7. TROUBLE CONCENTRATING ON THINGS, SUCH AS READING THE NEWSPAPER OR WATCHING TELEVISION: NEARLY EVERY DAY

## 2025-08-05 ASSESSMENT — ANXIETY QUESTIONNAIRES
4. TROUBLE RELAXING: SEVERAL DAYS
5. BEING SO RESTLESS THAT IT IS HARD TO SIT STILL: NOT AT ALL
1. FEELING NERVOUS, ANXIOUS, OR ON EDGE: MORE THAN HALF THE DAYS
7. FEELING AFRAID AS IF SOMETHING AWFUL MIGHT HAPPEN: SEVERAL DAYS
2. NOT BEING ABLE TO STOP OR CONTROL WORRYING: MORE THAN HALF THE DAYS
GAD7 TOTAL SCORE: 10
3. WORRYING TOO MUCH ABOUT DIFFERENT THINGS: MORE THAN HALF THE DAYS
IF YOU CHECKED OFF ANY PROBLEMS ON THIS QUESTIONNAIRE, HOW DIFFICULT HAVE THESE PROBLEMS MADE IT FOR YOU TO DO YOUR WORK, TAKE CARE OF THINGS AT HOME, OR GET ALONG WITH OTHER PEOPLE: SOMEWHAT DIFFICULT
6. BECOMING EASILY ANNOYED OR IRRITABLE: MORE THAN HALF THE DAYS

## 2025-08-05 NOTE — PROGRESS NOTES
"Brittni Soto is a 39 y.o. female patient presenting for in-person NPV  Visit location: patient ([facility], [facility] office), provider ([facility], [facility])  Pt identify self by name, , and address    Chief Complaint   Patient presents with    Anxiety    Depression    Med Management    ADHD      HPI    Referred by Marybeth Corral for consideration for medication management for ADHD. Patient was evaluated in 2025 for attention deficit disorders following difficulties with concentration at school and found to meet criteria for ADHD, predominantly, inattentive type. Patient also has a hx of anxiety and depression, r/o learning disorder based on previous clinical records. Reports she was previously prescribed Zoloft 25 mg for situational anxiety around the pandemic but she stopped taking with symptom resolution. She was also prescribed Chantix to help quit smoking - stopped for ~ 2 yrs, recently resumed d/t increased stress with school. 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 States she was suppose to finish school in 2025, but has repeated 3 classes so far d/t inability to focus, now scheduled to graduate in 2026. Reports struggles with intermittent anxiety and depression. States mom won't let a learning dx of disability in childhood d/t fear of labeling    Current S/Sx:  -Mood swings/disorder or bipolar symptoms:   - irritated and mood swings. Expressed frustration with inability to focus in class, \"I often feel like I don't belong, don't have a place here, PCP, Amarilis Gong, BENJAMÍN-CNP wrote a letter of accomodation, so my instructors now allow for extra time to complete test d/t delays to complete tests/exams.\"    Depression (single, recurrent, seasonal, specific intermittency):   -Other depressive sx/s: denies  -Fatigue/Energy: good  -Motivation: good  -Concentration: poor focus/attention/concentration  -Feeling hope/help/worthless: " sometimes  -Sleep: sleeps below average  -Appetite/Weight Changes: increased appetite, gained about 10 lbs in the last 3 months  - PHQ: (Patient Health Questionnaire-9 Score: 8 (8/5/2025 11:37 AM))    SI/HI  -Denies hx of suicidal intent/plan. Denies current suicidal intend/plan  -CSSRS Not assessed    Weapon safety  -Guns/Weapons at home: Denies    Psychosis/schizophrenia  - Psychosis: Denies hallucinations/delusions:   - Intrusive thoughts: denies    Anxiety: (generalized, social, agoraphobia, speaking, specific):   -Worry excessively: present, increasingly impactful  -NICK-7 (10)    PTSD/traumatic - symptoms  - Flashbacks: denies  -Nightmares: denies    OCD - symptoms  - Obsessive/compulsive thoughts/acts: obsessed with organization    ADHD - syptoms  - newly dx ADHD, inattentive type  - BAARS-IV Questionnaire: n/a     Panic attacks - symptoms  denies     HISTORY  PSYCH HISTORY  -Psych Hx: anxiety and depression  -Psych Hospitalization Hx: denies  -Suicide Attempt Hx: denies  -Self-Harm/Violence Hx: denies  -Current psych meds: none  -Psych Med Hx: Zoloft 25 mg (effective - symptoms resolved, stopped taking)     SUBSTANCE USE HISTORY  -Substance Use Hx: denies  -ETOH: occasionally  -Tobacco: black and mild cigars - 1 several times/week when stressed  -Caffeine: sometimes - causes upset stomach  -Substance Abuse Treatment Hx: denies     FAMILY HISTORY  -Family Psych Hx: bother: schizophrenia, sister: PTSD (complications from childbirth)  -Family Suicide Hx: denies  -Family Substance Abuse Hx: denies     SOCIAL HISTORY  -Upbringing (family constellation): Grew up with mom but saw dad every other weekend, holidays, and over the summer.  Pt has one full sibling and 6 half siblings.   -income: denies financial struggles  -safety: feels safe at home/generally  -Support system: good  -Trauma: emotional trauma growing up  -Education: in college for RN  -Work: [employer]  -Marital Status: in a relationship  -Children:  "1 daughter (19 yrs old)  -Living situation: lives with daughter and partner  -: denies  -Legal: denies      MEDICAL HISTORY  -PCP: Amarilis Gong, BENJAMÍN-CNP  -TBI/head trauma/LOC/seizure hx: denies     REVIEW OF SYSTEMS  Review of Systems   All other systems reviewed and are negative.       PHYSICAL EXAM  Physical Exam    Psychiatric:         Attention and Perception: Attention and perception normal.         Mood and Affect: Affect is labile and tearful.         Speech: Speech normal.         Behavior: Behavior normal. Behavior is cooperative.         Thought Content: Thought content normal.         Cognition and Memory: Cognition and memory normal.         Judgment: Judgment normal.          IMPRESSION  Brittni Soto is a 39 y.o. female patient presents for in person NPV. Patient was recently diagnosed with ADHD, predominantly inattentive type, through psychological testing by Marybeth Corral on 5/9/2025.  Patient also meets criteria for mild to moderate anxiety and depression resulting from recent school related stress.  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  Patient reports struggling with symptoms since childhood but mother would not allow her to get tested for fear of \"labeling.\" as a result of current struggles, patient was according to a recommendation letter by PCP for consideration for accommodation with tests/exams.  Otherwise, denies panic attacks, but endorses intermittent mood swings.  Denies SI/HI/AVH/delusions/ervin/hypomania.  Denies substance use except for intermittent use of black&mild when stressed.  Sleep/appetite-fluctuates.  Patient was previously prescribed Zoloft for situational anxiety around the pandemic but symptoms resolved, so stopped taking.  Based on ongoing symptoms relating to anxiety and depression, will reconsider Zoloft and " "start patient on Adderall XR to control attention deficit symptoms.  Will follow up with this provider in 5 weeks to continue monitoring, or sooner if needed.      Plan:     1. Reviewed diagnostic impression including subjective and objective data and provided education about anxiety, depression, ADHD, etiology, treatment recommendations including medication, therapy, course of treatment and prognosis. Patient amenable to treatment plan.     2. Safety Assessment: History of no thoughts, but denies current thoughts of SI, plan/intent. No h/o SA. RF include none. PF include strong coping skills, sense of responsibility towards family, child related concerns/living with child <18 years, positive family relationships, hopefulness/future orientation, marriage/partnership, and employment, engaged in care, future oriented, no guns. Low imminent risk     3. @  Problem List Items Addressed This Visit    None  Visit Diagnoses         Attention deficit hyperactivity disorder (ADHD), predominantly inattentive type        Relevant Medications    sertraline (Zoloft) 50 mg tablet    Other Relevant Orders    Drug Screen, Urine With Reflex to Confirmation      Anxiety and depression        Relevant Medications    sertraline (Zoloft) 50 mg tablet      Compliance with medication regimen        Relevant Orders    Drug Screen, Urine With Reflex to Confirmation           START Adderall XR 10 mg daily (filled until 10/5/25)  START Zoloft 50 mg, take 0.5 mg (25 mg) daily x 7 days, then 1 tabs (50 mg) daily until next visit     Reviewed r/b/a, possible side effects of the medication. Client is aware about the benefit outweighs the risk.     4. INDIVIDUAL THERAPY: Not currently attending     5. OARRS: No meds prescribed in the last year     6. Labs reviewed     7. Last Urine Drug Screen    Urine Drug Screen   No results found for: \"AMPHETAMINE\", \"BARBSCRNUR\", \"CANNABINOID\", \"DOAUR\", \"FENTANYL\", \"OPIATE\", \"OXYCODONE\", \"PCP\", \"BENZO\", \"COCAI\", " "\"METH\"      Date of Last Screen: 8/5/2025    8. Controlled Substance Agreement:  Date of the Last Agreement: 08/05/2025  Reviewed Controlled Substance Agreement including but not limited to the benefits, risks, and alternatives to treatment with a Controlled Substance medication(s).     9. Follow-up with this provider in 5 weeks.     10. -Total time: 55 minutes via in-person     - Follow up with physical health providers as scheduled  - May follow up sooner if experiences worsening symptoms by calling  Psychiatry at [phone]  - Patient verbalized an understanding to call [facility] at [phone] ([facility]), [phone], or [phone]/go to the nearest emergency room if experiences thoughts of harm to self or others.   "

## 2025-08-06 LAB
AMPHETAMINES UR QL: NEGATIVE NG/ML
BARBITURATES UR QL: NEGATIVE NG/ML
BENZODIAZ UR QL: NEGATIVE NG/ML
BZE UR QL: NEGATIVE NG/ML
CREAT UR-MCNC: 129.5 MG/DL
FENTANYL UR QL SCN: NEGATIVE NG/ML
METHADONE UR QL: NEGATIVE NG/ML
OPIATES UR QL: NEGATIVE NG/ML
OXIDANTS UR QL: NEGATIVE MCG/ML
OXYCODONE UR QL: NEGATIVE NG/ML
PCP UR QL: NEGATIVE NG/ML
PH UR: 6.4 [PH] (ref 4.5–9)
QUEST NOTES AND COMMENTS: NORMAL
THC UR QL: NEGATIVE NG/ML

## 2025-08-06 RX ORDER — DEXTROAMPHETAMINE SACCHARATE, AMPHETAMINE ASPARTATE MONOHYDRATE, DEXTROAMPHETAMINE SULFATE AND AMPHETAMINE SULFATE 2.5; 2.5; 2.5; 2.5 MG/1; MG/1; MG/1; MG/1
10 CAPSULE, EXTENDED RELEASE ORAL EVERY MORNING
Qty: 30 CAPSULE | Refills: 0 | Status: SHIPPED | OUTPATIENT
Start: 2025-08-06 | End: 2025-09-05

## 2025-08-06 RX ORDER — DEXTROAMPHETAMINE SACCHARATE, AMPHETAMINE ASPARTATE MONOHYDRATE, DEXTROAMPHETAMINE SULFATE AND AMPHETAMINE SULFATE 2.5; 2.5; 2.5; 2.5 MG/1; MG/1; MG/1; MG/1
10 CAPSULE, EXTENDED RELEASE ORAL EVERY MORNING
Qty: 30 CAPSULE | Refills: 0 | Status: SHIPPED | OUTPATIENT
Start: 2025-09-05 | End: 2025-10-05

## 2025-08-13 ENCOUNTER — CLINICAL SUPPORT (OUTPATIENT)
Dept: PRIMARY CARE | Facility: CLINIC | Age: 40
End: 2025-08-13
Payer: COMMERCIAL

## 2025-08-13 DIAGNOSIS — Z30.42 ENCOUNTER FOR SURVEILLANCE OF INJECTABLE CONTRACEPTIVE: ICD-10-CM

## 2025-08-13 RX ORDER — MEDROXYPROGESTERONE ACETATE 150 MG/ML
150 INJECTION, SUSPENSION INTRAMUSCULAR ONCE
Status: COMPLETED | OUTPATIENT
Start: 2025-08-13 | End: 2025-08-13

## 2025-08-13 RX ADMIN — MEDROXYPROGESTERONE ACETATE 150 MG: 150 INJECTION, SUSPENSION INTRAMUSCULAR at 11:36

## 2025-08-29 DIAGNOSIS — I10 PRIMARY HYPERTENSION: ICD-10-CM

## 2025-09-01 RX ORDER — HYDROCHLOROTHIAZIDE 12.5 MG/1
12.5 TABLET ORAL DAILY
Qty: 90 TABLET | Refills: 1 | Status: SHIPPED | OUTPATIENT
Start: 2025-09-01 | End: 2026-02-28

## 2025-09-01 RX ORDER — AMLODIPINE BESYLATE 5 MG/1
5 TABLET ORAL DAILY
Qty: 90 TABLET | Refills: 1 | Status: SHIPPED | OUTPATIENT
Start: 2025-09-01 | End: 2026-02-28

## 2025-09-09 ENCOUNTER — APPOINTMENT (OUTPATIENT)
Dept: BEHAVIORAL HEALTH | Facility: HOSPITAL | Age: 40
End: 2025-09-09
Payer: COMMERCIAL